# Patient Record
Sex: FEMALE | Race: BLACK OR AFRICAN AMERICAN | NOT HISPANIC OR LATINO | Employment: UNEMPLOYED | URBAN - NONMETROPOLITAN AREA
[De-identification: names, ages, dates, MRNs, and addresses within clinical notes are randomized per-mention and may not be internally consistent; named-entity substitution may affect disease eponyms.]

---

## 2020-07-24 ENCOUNTER — HISTORICAL (OUTPATIENT)
Dept: ADMINISTRATIVE | Facility: HOSPITAL | Age: 25
End: 2020-07-24

## 2020-07-24 ENCOUNTER — ANESTHESIA EVENT (OUTPATIENT)
Dept: SURGERY | Facility: HOSPITAL | Age: 25
End: 2020-07-24
Payer: MEDICAID

## 2020-07-24 LAB
ABO + RH BLD: NORMAL
BASOPHILS NFR BLD: 0 10 (ref 0–0.1)
BASOPHILS NFR BLD: 0.4 % (ref 0–1.5)
EOSINOPHIL NFR BLD: 0.1 10 (ref 0–0.7)
EOSINOPHIL NFR BLD: 0.7 % (ref 0–7)
ERYTHROCYTE [DISTWIDTH] IN BLOOD BY AUTOMATED COUNT: 13.2 % (ref 11.5–14.5)
GRAN #: 7.42 10 (ref 2–7.5)
GRAN%: 0.5 %
GRAN%: 67.3 % (ref 50–80)
HCT VFR BLD AUTO: 36.6 % (ref 37.7–47.9)
HGB BLD-MCNC: 12 G/DL (ref 12.2–16.2)
HISTORY CHECK: NORMAL
IMMATURE GRANULOCYTES #: 0.05 10
INDIRECT COOMBS GEL: NEGATIVE
LYMPH #: 2.5 10 (ref 1–3.5)
LYMPH%: 22.3 % (ref 12–50)
MCH RBC QN AUTO: 26.8 PG (ref 27–31)
MCHC RBC AUTO-ENTMCNC: 32.8 G% (ref 32–35)
MCV RBC AUTO: 81.7 FL (ref 80–97)
MONO #: 1 10 (ref 0–0.8)
MONO%: 8.8 % (ref 0–12)
PMV BLD AUTO: 10.3 FL (ref 7.4–10.4)
PMV BLD AUTO: 255 10 (ref 142–424)
RBC # BLD AUTO: 4.48 M/UL (ref 4.04–5.48)
WBC # BLD AUTO: 11 10 (ref 4–10.2)

## 2020-07-24 RX ORDER — METOCLOPRAMIDE HYDROCHLORIDE 5 MG/ML
10 INJECTION INTRAMUSCULAR; INTRAVENOUS
Status: CANCELLED | OUTPATIENT
Start: 2020-07-27 | End: 2020-07-27

## 2020-07-24 RX ORDER — SODIUM CITRATE AND CITRIC ACID MONOHYDRATE 334; 500 MG/5ML; MG/5ML
30 SOLUTION ORAL
Status: DISCONTINUED | OUTPATIENT
Start: 2020-07-27 | End: 2020-07-24

## 2020-07-24 RX ORDER — SODIUM CHLORIDE 0.9 % (FLUSH) 0.9 %
10 SYRINGE (ML) INJECTION
Status: CANCELLED | OUTPATIENT
Start: 2020-07-28

## 2020-07-24 RX ORDER — INSULIN ASPART 100 [IU]/ML
0-9 INJECTION, SOLUTION INTRAVENOUS; SUBCUTANEOUS SEE ADMIN INSTRUCTIONS
Status: CANCELLED | OUTPATIENT
Start: 2020-07-27

## 2020-07-24 RX ORDER — CEFAZOLIN SODIUM 2 G/50ML
2 SOLUTION INTRAVENOUS
Status: CANCELLED | OUTPATIENT
Start: 2020-07-27 | End: 2020-07-27

## 2020-07-24 RX ORDER — INSULIN ASPART 100 [IU]/ML
0-9 INJECTION, SOLUTION INTRAVENOUS; SUBCUTANEOUS SEE ADMIN INSTRUCTIONS
Status: DISCONTINUED | OUTPATIENT
Start: 2020-07-24 | End: 2020-07-24

## 2020-07-24 RX ORDER — SODIUM CITRATE AND CITRIC ACID MONOHYDRATE 334; 500 MG/5ML; MG/5ML
30 SOLUTION ORAL
Status: CANCELLED | OUTPATIENT
Start: 2020-07-27 | End: 2020-07-27

## 2020-07-24 RX ORDER — CEFAZOLIN SODIUM 2 G/50ML
2 SOLUTION INTRAVENOUS
Status: DISCONTINUED | OUTPATIENT
Start: 2020-07-27 | End: 2020-07-24

## 2020-07-24 RX ORDER — FAMOTIDINE 10 MG/ML
20 INJECTION INTRAVENOUS
Status: CANCELLED | OUTPATIENT
Start: 2020-07-24 | End: 2020-07-25

## 2020-07-24 RX ORDER — SODIUM CHLORIDE, SODIUM LACTATE, POTASSIUM CHLORIDE, CALCIUM CHLORIDE 600; 310; 30; 20 MG/100ML; MG/100ML; MG/100ML; MG/100ML
INJECTION, SOLUTION INTRAVENOUS CONTINUOUS
Status: CANCELLED | OUTPATIENT
Start: 2020-07-27

## 2020-07-24 RX ORDER — SODIUM CITRATE AND CITRIC ACID MONOHYDRATE 334; 500 MG/5ML; MG/5ML
30 SOLUTION ORAL ONCE
Status: CANCELLED | OUTPATIENT
Start: 2020-07-27

## 2020-07-24 NOTE — ANESTHESIA PREPROCEDURE EVALUATION
07/24/2020  Khris Sheikh is a 24 y.o., female.    Anesthesia Evaluation    I have reviewed the Patient Summary Reports.   I have reviewed the NPO Status.   I have reviewed the Medications.     Review of Systems  Anesthesia Hx:  No problems with previous Anesthesia  Denies Family Hx of Anesthesia complications.   Denies Personal Hx of Anesthesia complications.   Social:  Non-Smoker    Cardiovascular:  Cardiovascular Normal     Pulmonary:  Pulmonary Normal    Renal/:  Renal/ Normal     Hepatic/GI:  Hepatic/GI Normal    Neurological:  Neurology Normal    Endocrine:   Diabetes, well controlled, gestational        Physical Exam  General:  Well nourished    Airway/Jaw/Neck:  Airway Findings: Mouth Opening: Normal Tongue: Normal  General Airway Assessment: Adult  Jaw/Neck Findings:  Neck ROM: Normal ROM      Dental:  Dental Findings: In tact   Chest/Lungs:  Chest/Lungs Findings: Clear to auscultation, Normal Respiratory Rate     Heart/Vascular:  Heart Findings: Rate: Normal  Rhythm: Regular Rhythm  Sounds: Normal        Mental Status:  Mental Status Findings:  Cooperative         Anesthesia Plan  Type of Anesthesia, risks & benefits discussed:  Anesthesia Type:  spinal  Patient's Preference:   Intra-op Monitoring Plan: standard ASA monitors  Intra-op Monitoring Plan Comments:   Post Op Pain Control Plan: multimodal analgesia  Post Op Pain Control Plan Comments:   Induction:    Beta Blocker:  Patient is not currently on a Beta-Blocker (No further documentation required).       Informed Consent: Patient understands risks and agrees with Anesthesia plan.  Questions answered. Anesthesia consent signed with patient.  ASA Score: 2     Day of Surgery Review of History & Physical: I have interviewed and examined the patient. I have reviewed the patient's H&P dated:  There are no significant changes.  H&P update  referred to the surgeon.         Ready For Surgery From Anesthesia Perspective.

## 2020-07-25 LAB — SARS-COV-2 RNA RESP QL NAA+PROBE: NOT DETECTED

## 2020-07-27 ENCOUNTER — ANESTHESIA (OUTPATIENT)
Dept: SURGERY | Facility: HOSPITAL | Age: 25
End: 2020-07-27
Payer: MEDICAID

## 2020-07-27 ENCOUNTER — HOSPITAL ENCOUNTER (INPATIENT)
Facility: HOSPITAL | Age: 25
LOS: 2 days | Discharge: HOME | End: 2020-07-29
Attending: OBSTETRICS & GYNECOLOGY | Admitting: OBSTETRICS & GYNECOLOGY
Payer: MEDICAID

## 2020-07-27 DIAGNOSIS — Z98.891 PREVIOUS CESAREAN SECTION: ICD-10-CM

## 2020-07-27 LAB
AMPHET+METHAMPHET UR QL: NEGATIVE
BARBITURATES UR QL SCN>200 NG/ML: NEGATIVE
BENZODIAZ UR QL SCN>200 NG/ML: NEGATIVE
BZE UR QL SCN: NEGATIVE
CANNABINOIDS UR QL SCN: NEGATIVE
CREAT UR-MCNC: 68.3 MG/DL (ref 15–325)
METHADONE UR QL SCN>300 NG/ML: NEGATIVE
OPIATES UR QL SCN: NEGATIVE
PCP UR QL SCN>25 NG/ML: NEGATIVE
POCT GLUCOSE: 69 MG/DL (ref 70–110)
POCT GLUCOSE: 82 MG/DL (ref 70–110)
TOXICOLOGY INFORMATION: NORMAL

## 2020-07-27 PROCEDURE — 71000033 HC RECOVERY, INTIAL HOUR: Performed by: OBSTETRICS & GYNECOLOGY

## 2020-07-27 PROCEDURE — 63600175 PHARM REV CODE 636 W HCPCS: Performed by: OBSTETRICS & GYNECOLOGY

## 2020-07-27 PROCEDURE — 36000709 HC OR TIME LEV III EA ADD 15 MIN: Performed by: OBSTETRICS & GYNECOLOGY

## 2020-07-27 PROCEDURE — S0028 INJECTION, FAMOTIDINE, 20 MG: HCPCS | Performed by: OBSTETRICS & GYNECOLOGY

## 2020-07-27 PROCEDURE — 63600175 PHARM REV CODE 636 W HCPCS: Performed by: NURSE ANESTHETIST, CERTIFIED REGISTERED

## 2020-07-27 PROCEDURE — 37000008 HC ANESTHESIA 1ST 15 MINUTES: Performed by: OBSTETRICS & GYNECOLOGY

## 2020-07-27 PROCEDURE — 11000001 HC ACUTE MED/SURG PRIVATE ROOM

## 2020-07-27 PROCEDURE — 27201423 OPTIME MED/SURG SUP & DEVICES STERILE SUPPLY: Performed by: OBSTETRICS & GYNECOLOGY

## 2020-07-27 PROCEDURE — 36000708 HC OR TIME LEV III 1ST 15 MIN: Performed by: OBSTETRICS & GYNECOLOGY

## 2020-07-27 PROCEDURE — 25000003 PHARM REV CODE 250: Performed by: NURSE ANESTHETIST, CERTIFIED REGISTERED

## 2020-07-27 PROCEDURE — 80307 DRUG TEST PRSMV CHEM ANLYZR: CPT

## 2020-07-27 PROCEDURE — 82962 GLUCOSE BLOOD TEST: CPT | Performed by: OBSTETRICS & GYNECOLOGY

## 2020-07-27 PROCEDURE — 25000003 PHARM REV CODE 250: Performed by: OBSTETRICS & GYNECOLOGY

## 2020-07-27 PROCEDURE — 37000009 HC ANESTHESIA EA ADD 15 MINS: Performed by: OBSTETRICS & GYNECOLOGY

## 2020-07-27 RX ORDER — SODIUM CHLORIDE 0.9 % (FLUSH) 0.9 %
10 SYRINGE (ML) INJECTION
Status: DISCONTINUED | OUTPATIENT
Start: 2020-07-28 | End: 2020-07-29 | Stop reason: HOSPADM

## 2020-07-27 RX ORDER — ONDANSETRON 2 MG/ML
INJECTION INTRAMUSCULAR; INTRAVENOUS
Status: DISCONTINUED | OUTPATIENT
Start: 2020-07-27 | End: 2020-07-27

## 2020-07-27 RX ORDER — HYDROCORTISONE 25 MG/G
CREAM TOPICAL 3 TIMES DAILY PRN
Status: DISCONTINUED | OUTPATIENT
Start: 2020-07-27 | End: 2020-07-29 | Stop reason: HOSPADM

## 2020-07-27 RX ORDER — EPHEDRINE SULFATE 50 MG/ML
INJECTION, SOLUTION INTRAVENOUS
Status: DISCONTINUED | OUTPATIENT
Start: 2020-07-27 | End: 2020-07-27

## 2020-07-27 RX ORDER — ADHESIVE BANDAGE
30 BANDAGE TOPICAL 2 TIMES DAILY PRN
Status: DISCONTINUED | OUTPATIENT
Start: 2020-07-28 | End: 2020-07-29 | Stop reason: HOSPADM

## 2020-07-27 RX ORDER — SODIUM CHLORIDE, SODIUM LACTATE, POTASSIUM CHLORIDE, CALCIUM CHLORIDE 600; 310; 30; 20 MG/100ML; MG/100ML; MG/100ML; MG/100ML
INJECTION, SOLUTION INTRAVENOUS CONTINUOUS
Status: DISCONTINUED | OUTPATIENT
Start: 2020-07-27 | End: 2020-07-28

## 2020-07-27 RX ORDER — CEFAZOLIN SODIUM 2 G/50ML
2 SOLUTION INTRAVENOUS
Status: COMPLETED | OUTPATIENT
Start: 2020-07-27 | End: 2020-07-27

## 2020-07-27 RX ORDER — OXYTOCIN/RINGER'S LACTATE 30/500 ML
95 PLASTIC BAG, INJECTION (ML) INTRAVENOUS ONCE
Status: COMPLETED | OUTPATIENT
Start: 2020-07-27 | End: 2020-07-27

## 2020-07-27 RX ORDER — SODIUM CHLORIDE, SODIUM LACTATE, POTASSIUM CHLORIDE, CALCIUM CHLORIDE 600; 310; 30; 20 MG/100ML; MG/100ML; MG/100ML; MG/100ML
INJECTION, SOLUTION INTRAVENOUS CONTINUOUS
Status: DISCONTINUED | OUTPATIENT
Start: 2020-07-27 | End: 2020-07-27

## 2020-07-27 RX ORDER — MORPHINE SULFATE 10 MG/ML
2 INJECTION INTRAMUSCULAR; INTRAVENOUS; SUBCUTANEOUS EVERY 5 MIN PRN
Status: DISCONTINUED | OUTPATIENT
Start: 2020-07-27 | End: 2020-07-27

## 2020-07-27 RX ORDER — DIPHENHYDRAMINE HCL 25 MG
25 CAPSULE ORAL EVERY 4 HOURS PRN
Status: DISCONTINUED | OUTPATIENT
Start: 2020-07-27 | End: 2020-07-29 | Stop reason: HOSPADM

## 2020-07-27 RX ORDER — METOCLOPRAMIDE HYDROCHLORIDE 5 MG/ML
10 INJECTION INTRAMUSCULAR; INTRAVENOUS
Status: COMPLETED | OUTPATIENT
Start: 2020-07-27 | End: 2020-07-27

## 2020-07-27 RX ORDER — SODIUM CITRATE AND CITRIC ACID MONOHYDRATE 334; 500 MG/5ML; MG/5ML
30 SOLUTION ORAL ONCE
Status: COMPLETED | OUTPATIENT
Start: 2020-07-27 | End: 2020-07-27

## 2020-07-27 RX ORDER — ONDANSETRON 4 MG/1
8 TABLET, ORALLY DISINTEGRATING ORAL EVERY 8 HOURS PRN
Status: DISCONTINUED | OUTPATIENT
Start: 2020-07-27 | End: 2020-07-29 | Stop reason: HOSPADM

## 2020-07-27 RX ORDER — ONDANSETRON 2 MG/ML
4 INJECTION INTRAMUSCULAR; INTRAVENOUS EVERY 6 HOURS PRN
Status: DISCONTINUED | OUTPATIENT
Start: 2020-07-27 | End: 2020-07-27

## 2020-07-27 RX ORDER — HYDROMORPHONE HYDROCHLORIDE 1 MG/ML
0.2 INJECTION, SOLUTION INTRAMUSCULAR; INTRAVENOUS; SUBCUTANEOUS EVERY 5 MIN PRN
Status: DISCONTINUED | OUTPATIENT
Start: 2020-07-27 | End: 2020-07-27

## 2020-07-27 RX ORDER — MUPIROCIN 20 MG/G
OINTMENT TOPICAL 2 TIMES DAILY
Status: DISCONTINUED | OUTPATIENT
Start: 2020-07-27 | End: 2020-07-27

## 2020-07-27 RX ORDER — INSULIN ASPART 100 [IU]/ML
0-9 INJECTION, SOLUTION INTRAVENOUS; SUBCUTANEOUS SEE ADMIN INSTRUCTIONS
Status: DISCONTINUED | OUTPATIENT
Start: 2020-07-27 | End: 2020-07-27

## 2020-07-27 RX ORDER — DOCUSATE SODIUM 100 MG/1
200 CAPSULE, LIQUID FILLED ORAL 2 TIMES DAILY
Status: DISCONTINUED | OUTPATIENT
Start: 2020-07-27 | End: 2020-07-28

## 2020-07-27 RX ORDER — FAMOTIDINE 10 MG/ML
20 INJECTION INTRAVENOUS
Status: COMPLETED | OUTPATIENT
Start: 2020-07-27 | End: 2020-07-27

## 2020-07-27 RX ORDER — PRENATAL WITH FERROUS FUM AND FOLIC ACID 3080; 920; 120; 400; 22; 1.84; 3; 20; 10; 1; 12; 200; 27; 25; 2 [IU]/1; [IU]/1; MG/1; [IU]/1; MG/1; MG/1; MG/1; MG/1; MG/1; MG/1; UG/1; MG/1; MG/1; MG/1; MG/1
1 TABLET ORAL DAILY
Status: DISCONTINUED | OUTPATIENT
Start: 2020-07-27 | End: 2020-07-27

## 2020-07-27 RX ORDER — IBUPROFEN 800 MG/1
800 TABLET ORAL EVERY 8 HOURS
Status: DISCONTINUED | OUTPATIENT
Start: 2020-07-27 | End: 2020-07-29 | Stop reason: HOSPADM

## 2020-07-27 RX ORDER — AMOXICILLIN 250 MG
1 CAPSULE ORAL NIGHTLY PRN
Status: DISCONTINUED | OUTPATIENT
Start: 2020-07-27 | End: 2020-07-29 | Stop reason: HOSPADM

## 2020-07-27 RX ORDER — OXYCODONE AND ACETAMINOPHEN 10; 325 MG/1; MG/1
1 TABLET ORAL EVERY 4 HOURS PRN
Status: DISCONTINUED | OUTPATIENT
Start: 2020-07-27 | End: 2020-07-29 | Stop reason: SDUPTHER

## 2020-07-27 RX ORDER — OXYTOCIN/RINGER'S LACTATE 30/500 ML
42 PLASTIC BAG, INJECTION (ML) INTRAVENOUS ONCE
Status: COMPLETED | OUTPATIENT
Start: 2020-07-28 | End: 2020-07-28

## 2020-07-27 RX ORDER — DIPHENHYDRAMINE HYDROCHLORIDE 50 MG/ML
25 INJECTION INTRAMUSCULAR; INTRAVENOUS EVERY 6 HOURS PRN
Status: DISCONTINUED | OUTPATIENT
Start: 2020-07-27 | End: 2020-07-27

## 2020-07-27 RX ORDER — SIMETHICONE 80 MG
1 TABLET,CHEWABLE ORAL EVERY 6 HOURS PRN
Status: DISCONTINUED | OUTPATIENT
Start: 2020-07-27 | End: 2020-07-29 | Stop reason: HOSPADM

## 2020-07-27 RX ORDER — OXYTOCIN 10 [USP'U]/ML
INJECTION, SOLUTION INTRAMUSCULAR; INTRAVENOUS
Status: DISCONTINUED | OUTPATIENT
Start: 2020-07-27 | End: 2020-07-27

## 2020-07-27 RX ORDER — BISACODYL 10 MG
10 SUPPOSITORY, RECTAL RECTAL ONCE AS NEEDED
Status: DISCONTINUED | OUTPATIENT
Start: 2020-07-27 | End: 2020-07-29 | Stop reason: HOSPADM

## 2020-07-27 RX ADMIN — OXYTOCIN 20 UNITS: 10 INJECTION, SOLUTION INTRAMUSCULAR; INTRAVENOUS at 09:07

## 2020-07-27 RX ADMIN — EPHEDRINE SULFATE 25 MG: 50 INJECTION, SOLUTION INTRAVENOUS at 08:07

## 2020-07-27 RX ADMIN — ONDANSETRON 4 MG: 2 INJECTION INTRAMUSCULAR; INTRAVENOUS at 09:07

## 2020-07-27 RX ADMIN — DOCUSATE SODIUM 200 MG: 100 CAPSULE, LIQUID FILLED ORAL at 10:07

## 2020-07-27 RX ADMIN — FAMOTIDINE 20 MG: 10 INJECTION, SOLUTION INTRAVENOUS at 07:07

## 2020-07-27 RX ADMIN — SODIUM CHLORIDE, SODIUM LACTATE, POTASSIUM CHLORIDE, AND CALCIUM CHLORIDE: .6; .31; .03; .02 INJECTION, SOLUTION INTRAVENOUS at 12:07

## 2020-07-27 RX ADMIN — SODIUM CHLORIDE, SODIUM LACTATE, POTASSIUM CHLORIDE, AND CALCIUM CHLORIDE: 600; 310; 30; 20 INJECTION, SOLUTION INTRAVENOUS at 08:07

## 2020-07-27 RX ADMIN — Medication 95 MILLI-UNITS/MIN: at 01:07

## 2020-07-27 RX ADMIN — THERA TABS 1 TABLET: TAB at 01:07

## 2020-07-27 RX ADMIN — OXYCODONE HYDROCHLORIDE AND ACETAMINOPHEN 1 TABLET: 10; 325 TABLET ORAL at 11:07

## 2020-07-27 RX ADMIN — METOCLOPRAMIDE 10 MG: 5 INJECTION, SOLUTION INTRAMUSCULAR; INTRAVENOUS at 07:07

## 2020-07-27 RX ADMIN — SODIUM CITRATE AND CITRIC ACID MONOHYDRATE 30 ML: 500; 334 SOLUTION ORAL at 07:07

## 2020-07-27 RX ADMIN — OXYCODONE HYDROCHLORIDE AND ACETAMINOPHEN 1 TABLET: 10; 325 TABLET ORAL at 03:07

## 2020-07-27 RX ADMIN — IBUPROFEN 800 MG: 800 TABLET, FILM COATED ORAL at 09:07

## 2020-07-27 RX ADMIN — CEFAZOLIN SODIUM 2 G: 2 SOLUTION INTRAVENOUS at 07:07

## 2020-07-27 RX ADMIN — IBUPROFEN 800 MG: 800 TABLET, FILM COATED ORAL at 03:07

## 2020-07-27 RX ADMIN — DOCUSATE SODIUM 200 MG: 100 CAPSULE, LIQUID FILLED ORAL at 08:07

## 2020-07-27 NOTE — H&P
[place] - Labor And Delivery  Obstetrics  History & Physical    Patient Name: Khris Sheikh  MRN: [id number]  Admission Date: 2020  Primary Care Provider: Cindy Garnica MD    Subjective:     Principal Problem:<principal problem not specified>    History of Present Illness:  23 yo  at 39.0 weeks presents for scheduled repeat     Obstetric HPI:  Patient reports None contractions, active fetal movement, No vaginal bleeding , No loss of fluid     This pregnancy has been complicated by Gestational diabetes, and rubella non-immune.    OB History    Para Term  AB Living   2 1 0 0 0 1   SAB TAB Ectopic Multiple Live Births   0 0 0 0 1      # Outcome Date GA Lbr Reinaldo/2nd Weight Sex Delivery Anes PTL Lv   2 Current            1 Para      CS-LTranv   PRIYA     Past Medical History:   Diagnosis Date    Diabetes mellitus     Gestational diabetes      Past Surgical History:   Procedure Laterality Date     SECTION         PTA Medications   Medication Sig    prenatal vit37/iron/folic acid (PRENATA ORAL) Take 1 tablet by mouth once daily.       Review of patient's allergies indicates:  No Known Allergies     Family History     Problem Relation (Age of Onset)    Diabetes Mother, Father    Heart failure Mother    Lung disease Father        Tobacco Use    Smoking status: Never Smoker   Substance and Sexual Activity    Alcohol use: Not Currently    Drug use: Not Currently    Sexual activity: Not on file     Review of Systems   Respiratory: Negative for cough and shortness of breath.    Gastrointestinal: Positive for abdominal pain. Negative for constipation and diarrhea.      Objective:     Vital Signs (Most Recent):    Vital Signs (24h Range):        Weight: 104.3 kg (230 lb)  Body mass index is 40.74 kg/m².    FHT: 130's Cat 1 (reassuring)  TOCO:  Q 8 minutes    Physical Exam:   Constitutional: She appears well-developed and well-nourished.       Cardiovascular: Normal rate and regular  rhythm.  Exam reveals no clubbing, no cyanosis and no edema.    No murmur heard.   Pulmonary/Chest: Effort normal and breath sounds normal.        Abdominal: Soft. There is no abdominal tenderness.                  Skin: No cyanosis. Nails show no clubbing.        Cervix:  Dilation:  0  Effacement:  50%  Station: -2  Presentation: Vertex     Significant Labs:  Lab Results   Component Value Date    [facility]       I have personallly reviewed all pertinent lab results from the last 24 hours.    Assessment/Plan:     24 y.o. female  at Unknown for:    Previous  section  Proceed with repeat         Cindy Garnica MD  Obstetrics  [place] - Labor And Delivery

## 2020-07-27 NOTE — ANESTHESIA PROCEDURE NOTES
Spinal    Diagnosis: previos csection  Patient location during procedure: OR  Start time: 7/27/2020 8:42 AM  Timeout: 7/27/2020 8:38 AM  End time: 7/27/2020 8:48 AM    Staffing  Authorizing Provider: Km Lockwood MD  Performing Provider: Kaleb Murcia CRNA    Preanesthetic Checklist  Completed: patient identified, site marked, surgical consent, pre-op evaluation, timeout performed, IV checked, risks and benefits discussed and monitors and equipment checked  Spinal Block  Patient position: sitting  Prep: Betadine  Patient monitoring: heart rate, cardiac monitor and continuous pulse ox  Approach: midline  Location: L3-4  Injection technique: lumbar puncture  CSF Fluid: clear free-flowing CSF  Needle  Needle type: pencil-tip   Needle gauge: 25 G  Needle length: 3.5 in  Additional Documentation: negative aspiration for heme and no paresthesia on injection  Needle localization: anatomical landmarks  Assessment  Sensory level: T5   Dermatomal levels determined by pinch or prick  Ease of block: easy  Patient's tolerance of the procedure: comfortable throughout block

## 2020-07-27 NOTE — SUBJECTIVE & OBJECTIVE
Obstetric HPI:  Patient reports None contractions, active fetal movement, No vaginal bleeding , No loss of fluid     This pregnancy has been complicated by Gestational diabetes, and rubella non-immune.    OB History    Para Term  AB Living   2 1 0 0 0 1   SAB TAB Ectopic Multiple Live Births   0 0 0 0 1      # Outcome Date GA Lbr Reinaldo/2nd Weight Sex Delivery Anes PTL Lv   2 Current            1 Para      CS-LTranv   PRIYA     Past Medical History:   Diagnosis Date    Diabetes mellitus     Gestational diabetes      Past Surgical History:   Procedure Laterality Date     SECTION         PTA Medications   Medication Sig    prenatal vit37/iron/folic acid (PRENATA ORAL) Take 1 tablet by mouth once daily.       Review of patient's allergies indicates:  No Known Allergies     Family History     Problem Relation (Age of Onset)    Diabetes Mother, Father    Heart failure Mother    Lung disease Father        Tobacco Use    Smoking status: Never Smoker   Substance and Sexual Activity    Alcohol use: Not Currently    Drug use: Not Currently    Sexual activity: Not on file     Review of Systems   Respiratory: Negative for cough and shortness of breath.    Gastrointestinal: Positive for abdominal pain. Negative for constipation and diarrhea.      Objective:     Vital Signs (Most Recent):    Vital Signs (24h Range):        Weight: 104.3 kg (230 lb)  Body mass index is 40.74 kg/m².    FHT: 130's Cat 1 (reassuring)  TOCO:  Q 8 minutes    Physical Exam:   Constitutional: She appears well-developed and well-nourished.       Cardiovascular: Normal rate and regular rhythm.  Exam reveals no clubbing, no cyanosis and no edema.    No murmur heard.   Pulmonary/Chest: Effort normal and breath sounds normal.        Abdominal: Soft. There is no abdominal tenderness.                  Skin: No cyanosis. Nails show no clubbing.        Cervix:  Dilation:  0  Effacement:  50%  Station: -2  Presentation: Vertex      Significant Labs:  Lab Results   Component Value Date    [facility] AB POSITIVE 07/24/2020       I have personallly reviewed all pertinent lab results from the last 24 hours.

## 2020-07-27 NOTE — TRANSFER OF CARE
"Anesthesia Transfer of Care Note    Patient: Khris Sheikh    Procedure(s) Performed: Procedure(s) (LRB):   SECTION (N/A)    Patient location: PACU    Anesthesia Type: spinal    Transport from OR: Transported from OR on room air with adequate spontaneous ventilation    Post pain: adequate analgesia    Post assessment: no apparent anesthetic complications    Post vital signs: stable    Level of consciousness: awake    Nausea/Vomiting: no nausea/vomiting    Complications: none    Transfer of care protocol was followed      Last vitals:   Visit Vitals  Temp 36.5 °C (97.7 °F)   Ht 5' 3" (1.6 m)   Wt 104.3 kg (230 lb)   Breastfeeding Unknown   BMI 40.74 kg/m²     "

## 2020-07-27 NOTE — OP NOTE
OPERATIVE NOTE       SUMMARY      Surgery Date: 2020     SURGEON: PB COTTON MD     ASSISTANT: ST LIDIA and ST GERALD    PREOP DIAGNOSIS: 1. Intrauterine pregnancy at 39.0 wks     2. Previous  x 1     3. Gestational diabetes    POSTOP DIAGNOSIS:   1. Intrauterine pregnancy at 39.0 wks     2. Previous  x 1     3. Gestational diabetes    PROCEDURES: repeat low-transverse  section    ANESTHESIA: spinal     FINDINGS/KEY COMPONENTS: viable male infant APGAR 9/9, weight unavailable at time of delivery     ESTIMATED BLOOD LOSS: 500cc    IV FLUIDS: 1300cc    UOP: 100cc     COMPLICATIONS: None    PATHOLOGY:   Specimens     None        PROCEDURE:    The patient was taken to the operating room where spinal anesthesia was found to be adequate.  She was prepped and draped in the normal sterile fashion.  Pfannensteil skin incision was made with the scalpel and carried down to the lower layer of fascia with the scalpel.  The fascia was incised in the mid-line.  This incision was extended laterally with the cautery.  The superior aspect of the incision was grasped with [facility] clamps, tented up, and the underlying muscles were dissected off bluntly.  The inferior aspect of the incision was grasped with the [facility] clamps and the underlying muscles were dissected off bluntly.  The muscles were  in the mid-line.  The peritoneum was grasped with hemostats and entered in bluntly.  This incision was extended superiorly and inferiorly with good visualization of the bladder.  The agusto self-retaining retractor was inserted without difficulty.  A low transverse incision was made on the uterus.  This incision was extended laterally with finger fracture.  The infant's head was delivered atraumatically.  The nose and mouth were suctioned with bulb suction.  The cord was clamped and cut.  The infant was handed to the waiting nurse.  The placenta was delivered spontaneously.  The uterus  was exteriorized and cleared of all clots and debris.  The uterine incision was repaired with #0 monocryl in a running, locked fashion.  Excellent hemostasis was noted.  The posterior gutter was cleared of all clots and debris.  The uterus was returned to the abdomen.  All instruments removed from the abdominal cavity. The incision was again inspected and found to be hemostatic.   The muscle was reapproximated in arunning fashion with #1 Chromic.  The fascia was reapproximated with #0 vicryl in a running fashion.  Skin was closed with 3-0 Monocryl in a subcuticular fashion.  Sponge, lap, needle counts were correct at beginning and end of procedure.  Patient was taken to the recover room awake and in stable condition with the Duarte draining clear urine.

## 2020-07-27 NOTE — PLAN OF CARE
Patient resting in bed with significant other at bedside. Patient denies any pain at this time, patient states medication effective. Bed in locked, lowest position, call bell within reach, water pitcher within reach.

## 2020-07-27 NOTE — PLAN OF CARE
Patient has maintained pain control with PO medications. Patient and baby has maintained feed goals. Patient has remained comfortable and has rested on and off throughout the shift post surgery.

## 2020-07-27 NOTE — ANESTHESIA POSTPROCEDURE EVALUATION
Anesthesia Post Evaluation    Patient: Khris Sheikh    Procedure(s) Performed: Procedure(s) (LRB):   SECTION (N/A)    Final Anesthesia Type: spinal    Patient location during evaluation: PACU  Patient participation: Yes- Able to Participate  Level of consciousness: awake  Post-procedure vital signs: reviewed and stable  Pain management: adequate  Airway patency: patent    PONV status at discharge: No PONV  Anesthetic complications: no      Cardiovascular status: blood pressure returned to baseline  Respiratory status: spontaneous ventilation  Hydration status: euvolemic  Follow-up not needed.          Vitals Value Taken Time   /63 20 0932   Temp 36.5 °C (97.7 °F) 20 0930   Pulse 101 20 0935   Resp 25 20 0935   SpO2 100 % 20 0935   Vitals shown include unvalidated device data.      No case tracking events are documented in the log.      Pain/Champ Score: No data recorded

## 2020-07-27 NOTE — PLAN OF CARE
Breasts soft no bra, fundus firm at umbilicus, lochia rubra scant, sanitary napkin x 2 to perineum. Warm blankets. Transported back to .

## 2020-07-28 LAB
BASOPHILS # BLD AUTO: 0.02 K/UL (ref 0–0.2)
BASOPHILS NFR BLD: 0.2 % (ref 0–1.9)
DIFFERENTIAL METHOD: ABNORMAL
EOSINOPHIL # BLD AUTO: 0.1 K/UL (ref 0–0.5)
EOSINOPHIL NFR BLD: 1.1 % (ref 0–8)
ERYTHROCYTE [DISTWIDTH] IN BLOOD BY AUTOMATED COUNT: 13.3 % (ref 11.5–14.5)
HCT VFR BLD AUTO: 36.4 % (ref 37–48.5)
HGB BLD-MCNC: 11.8 G/DL (ref 12–16)
IMM GRANULOCYTES # BLD AUTO: 0.06 K/UL (ref 0–0.04)
IMM GRANULOCYTES NFR BLD AUTO: 0.5 % (ref 0–0.5)
LYMPHOCYTES # BLD AUTO: 2 K/UL (ref 1–4.8)
LYMPHOCYTES NFR BLD: 16.5 % (ref 18–48)
MCH RBC QN AUTO: 26.8 PG (ref 27–31)
MCHC RBC AUTO-ENTMCNC: 32.4 G/DL (ref 32–36)
MCV RBC AUTO: 83 FL (ref 82–98)
MONOCYTES # BLD AUTO: 1 K/UL (ref 0.3–1)
MONOCYTES NFR BLD: 8.1 % (ref 4–15)
NEUTROPHILS # BLD AUTO: 9.1 K/UL (ref 1.8–7.7)
NEUTROPHILS NFR BLD: 73.6 % (ref 38–73)
NRBC BLD-RTO: 0 /100 WBC
PLATELET # BLD AUTO: 234 K/UL (ref 150–350)
PMV BLD AUTO: 10.2 FL (ref 9.2–12.9)
POCT GLUCOSE: 100 MG/DL (ref 70–110)
POCT GLUCOSE: 77 MG/DL (ref 70–110)
RBC # BLD AUTO: 4.41 M/UL (ref 4–5.4)
WBC # BLD AUTO: 12.36 K/UL (ref 3.9–12.7)

## 2020-07-28 PROCEDURE — 11000001 HC ACUTE MED/SURG PRIVATE ROOM

## 2020-07-28 PROCEDURE — 25000003 PHARM REV CODE 250: Performed by: OBSTETRICS & GYNECOLOGY

## 2020-07-28 PROCEDURE — 36415 COLL VENOUS BLD VENIPUNCTURE: CPT

## 2020-07-28 PROCEDURE — 85025 COMPLETE CBC W/AUTO DIFF WBC: CPT

## 2020-07-28 PROCEDURE — 63600175 PHARM REV CODE 636 W HCPCS: Performed by: OBSTETRICS & GYNECOLOGY

## 2020-07-28 RX ORDER — OXYTOCIN/RINGER'S LACTATE 30/500 ML
PLASTIC BAG, INJECTION (ML) INTRAVENOUS
Status: COMPLETED
Start: 2020-07-28 | End: 2020-07-28

## 2020-07-28 RX ORDER — DOCUSATE SODIUM 100 MG/1
100 CAPSULE, LIQUID FILLED ORAL 2 TIMES DAILY
Status: DISCONTINUED | OUTPATIENT
Start: 2020-07-28 | End: 2020-07-29 | Stop reason: HOSPADM

## 2020-07-28 RX ADMIN — Medication 42 MILLI-UNITS/MIN: at 01:07

## 2020-07-28 RX ADMIN — OXYCODONE HYDROCHLORIDE AND ACETAMINOPHEN 1 TABLET: 10; 325 TABLET ORAL at 02:07

## 2020-07-28 RX ADMIN — DOCUSATE SODIUM 100 MG: 100 CAPSULE, LIQUID FILLED ORAL at 09:07

## 2020-07-28 RX ADMIN — THERA TABS 1 TABLET: TAB at 09:07

## 2020-07-28 RX ADMIN — OXYCODONE HYDROCHLORIDE AND ACETAMINOPHEN 1 TABLET: 10; 325 TABLET ORAL at 09:07

## 2020-07-28 RX ADMIN — IBUPROFEN 800 MG: 800 TABLET, FILM COATED ORAL at 09:07

## 2020-07-28 RX ADMIN — IBUPROFEN 800 MG: 800 TABLET, FILM COATED ORAL at 02:07

## 2020-07-28 RX ADMIN — IBUPROFEN 800 MG: 800 TABLET, FILM COATED ORAL at 05:07

## 2020-07-28 NOTE — PROGRESS NOTES
[facility] - Labor And Delivery  Obstetrics  Postpartum Progress Note    Patient Name: Khris Sheikh  MRN: [id number]  Admission Date: 2020  Hospital Length of Stay: 1 days  Attending Physician: Cindy Garnica MD  Primary Care Provider: Cindy Garnica MD    Subjective:     Principal Problem:Previous  section    Hospital Course:  L&D to OR    Interval History:    She is doing well this morning. She is tolerating a regular diet without nausea or vomiting. She is voiding spontaneously. She is ambulatingm since alaniz removved. She has not passed flatus, and has not a BM. Vaginal bleeding is mild. She denies fever or chills. Abdominal pain is mild and controlled with oral medications. She is not breastfeeding.     Objective:     Vital Signs (Most Recent):  Temp: 98.5 °F (36.9 °C) (20 0600)  Pulse: 90 (20 0400)  Resp: 18 (20 0400)  BP: 121/61 (20 0400)  SpO2: 98 % (20) Vital Signs (24h Range):  Temp:  [97.5 °F (36.4 °C)-98.9 °F (37.2 °C)] 98.5 °F (36.9 °C)  Pulse:  [] 90  Resp:  [13-27] 18  SpO2:  [98 %-100 %] 98 %  BP: (114-135)/(58-67) 121/61     Weight: 104.3 kg (230 lb)  Body mass index is 40.74 kg/m².      Intake/Output Summary (Last 24 hours) at 2020 0813  Last data filed at 2020 0600  Gross per 24 hour   Intake 6726 ml   Output 4250 ml   Net 2476 ml           Significant Labs:  Lab Results   Component Value Date    [facility] AB POSITIVE 2020     No results for input(s): HGB, HCT in the last 48 hours.    I have personallly reviewed all pertinent lab results from the last 24 hours.    Physical Exam:   Constitutional: She appears well-developed.       Cardiovascular: Normal rate and regular rhythm.  Exam reveals no clubbing, no cyanosis and no edema.     Pulmonary/Chest: Breath sounds normal. She has no wheezes.        Abdominal: Soft. Bowel sounds are normal. She exhibits no distension.                  Skin: No cyanosis. Nails show no clubbing.     Psychiatric: She has a normal mood and affect.       Assessment/Plan:     24 y.o. female  for:    * Previous  section  Ambulate and await flatus        Disposition: As patient meets milestones, will plan to discharge tomorrow.    Cindy Garnica MD  Obstetrics  [place] - Labor And Delivery

## 2020-07-28 NOTE — PLAN OF CARE
Problem: Adult Inpatient Plan of Care  Goal: Plan of Care Review  Outcome: Ongoing, Progressing  Goal: Patient-Specific Goal (Individualization)  Outcome: Ongoing, Progressing  Goal: Absence of Hospital-Acquired Illness or Injury  Outcome: Ongoing, Progressing  Goal: Optimal Comfort and Wellbeing  Outcome: Ongoing, Progressing  Goal: Readiness for Transition of Care  Outcome: Ongoing, Progressing  Goal: Rounds/Family Conference  Outcome: Ongoing, Progressing     Problem:  Fall Injury Risk  Goal: Absence of Fall, Infant Drop and Related Injury  Outcome: Ongoing, Progressing     Problem: Infection  Goal: Infection Symptom Resolution  Outcome: Ongoing, Progressing     Problem: Skin Injury Risk Increased  Goal: Skin Health and Integrity  Outcome: Ongoing, Progressing     Problem: Adjustment to Role Transition (Postpartum  Delivery)  Goal: Successful Maternal Role Transition  Outcome: Ongoing, Progressing     Problem: Bleeding (Postpartum  Delivery)  Goal: Hemostasis  Outcome: Ongoing, Progressing     Problem: Infection (Postpartum  Delivery)  Goal: Absence of Infection Signs/Symptoms  Outcome: Ongoing, Progressing     Problem: Pain (Postpartum  Delivery)  Goal: Acceptable Pain Control  Outcome: Ongoing, Progressing     Problem: Postoperative Nausea and Vomiting (Postpartum  Delivery)  Goal: Nausea and Vomiting Relief  Outcome: Ongoing, Progressing     Problem: Postoperative Urinary Retention (Postpartum  Delivery)  Goal: Effective Urinary Elimination  Outcome: Ongoing, Progressing   Pt resting without any needs or complaints of discomfort at this time

## 2020-07-28 NOTE — SUBJECTIVE & OBJECTIVE
Interval History:    She is doing well this morning. She is tolerating a regular diet without nausea or vomiting. She is voiding spontaneously. She is ambulatingm since alaniz removved. She has not passed flatus, and has not a BM. Vaginal bleeding is mild. She denies fever or chills. Abdominal pain is mild and controlled with oral medications. She is not breastfeeding.     Objective:     Vital Signs (Most Recent):  Temp: 98.5 °F (36.9 °C) (07/28/20 0600)  Pulse: 90 (07/28/20 0400)  Resp: 18 (07/28/20 0400)  BP: 121/61 (07/28/20 0400)  SpO2: 98 % (07/27/20 2000) Vital Signs (24h Range):  Temp:  [97.5 °F (36.4 °C)-98.9 °F (37.2 °C)] 98.5 °F (36.9 °C)  Pulse:  [] 90  Resp:  [13-27] 18  SpO2:  [98 %-100 %] 98 %  BP: (114-135)/(58-67) 121/61     Weight: 104.3 kg (230 lb)  Body mass index is 40.74 kg/m².      Intake/Output Summary (Last 24 hours) at 7/28/2020 0813  Last data filed at 7/28/2020 0600  Gross per 24 hour   Intake 6726 ml   Output 4250 ml   Net 2476 ml           Significant Labs:  Lab Results   Component Value Date    [facility] AB POSITIVE 07/24/2020     No results for input(s): HGB, HCT in the last 48 hours.    I have personallly reviewed all pertinent lab results from the last 24 hours.    Physical Exam:   Constitutional: She appears well-developed.       Cardiovascular: Normal rate and regular rhythm.  Exam reveals no clubbing, no cyanosis and no edema.     Pulmonary/Chest: Breath sounds normal. She has no wheezes.        Abdominal: Soft. Bowel sounds are normal. She exhibits no distension.                  Skin: No cyanosis. Nails show no clubbing.    Psychiatric: She has a normal mood and affect.

## 2020-07-29 VITALS
BODY MASS INDEX: 40.75 KG/M2 | DIASTOLIC BLOOD PRESSURE: 60 MMHG | WEIGHT: 230 LBS | RESPIRATION RATE: 18 BRPM | SYSTOLIC BLOOD PRESSURE: 124 MMHG | TEMPERATURE: 97 F | OXYGEN SATURATION: 98 % | HEIGHT: 63 IN | HEART RATE: 86 BPM

## 2020-07-29 PROBLEM — Z98.891 PREVIOUS CESAREAN SECTION: Status: RESOLVED | Noted: 2020-07-27 | Resolved: 2020-07-29

## 2020-07-29 LAB — POCT GLUCOSE: 93 MG/DL (ref 70–110)

## 2020-07-29 PROCEDURE — 94799 UNLISTED PULMONARY SVC/PX: CPT

## 2020-07-29 PROCEDURE — 99900035 HC TECH TIME PER 15 MIN (STAT)

## 2020-07-29 PROCEDURE — 63600175 PHARM REV CODE 636 W HCPCS: Mod: SL | Performed by: OBSTETRICS & GYNECOLOGY

## 2020-07-29 PROCEDURE — 25000003 PHARM REV CODE 250: Performed by: OBSTETRICS & GYNECOLOGY

## 2020-07-29 PROCEDURE — 90471 IMMUNIZATION ADMIN: CPT | Mod: VFC | Performed by: OBSTETRICS & GYNECOLOGY

## 2020-07-29 PROCEDURE — 90710 MMRV VACCINE SC: CPT | Mod: SL | Performed by: OBSTETRICS & GYNECOLOGY

## 2020-07-29 RX ORDER — IBUPROFEN 800 MG/1
800 TABLET ORAL EVERY 8 HOURS
Qty: 30 TABLET | Refills: 1 | Status: SHIPPED | OUTPATIENT
Start: 2020-07-29 | End: 2022-05-10

## 2020-07-29 RX ORDER — OXYCODONE AND ACETAMINOPHEN 5; 325 MG/1; MG/1
1 TABLET ORAL EVERY 4 HOURS PRN
Status: DISCONTINUED | OUTPATIENT
Start: 2020-07-29 | End: 2020-07-29 | Stop reason: HOSPADM

## 2020-07-29 RX ORDER — DOCUSATE SODIUM 100 MG/1
100 CAPSULE, LIQUID FILLED ORAL 2 TIMES DAILY
Qty: 60 CAPSULE | Refills: 1 | Status: SHIPPED | OUTPATIENT
Start: 2020-07-29 | End: 2022-05-10 | Stop reason: SDUPTHER

## 2020-07-29 RX ORDER — OXYCODONE AND ACETAMINOPHEN 5; 325 MG/1; MG/1
1 TABLET ORAL EVERY 4 HOURS PRN
Qty: 28 TABLET | Refills: 0 | Status: SHIPPED | OUTPATIENT
Start: 2020-07-29 | End: 2022-05-10

## 2020-07-29 RX ADMIN — MEASLES, MUMPS, AND RUBELLA VIRUS VACCINE LIVE 0.5 ML: 1000; 12500; 1000 INJECTION, POWDER, LYOPHILIZED, FOR SUSPENSION SUBCUTANEOUS at 09:07

## 2020-07-29 RX ADMIN — IBUPROFEN 800 MG: 800 TABLET, FILM COATED ORAL at 06:07

## 2020-07-29 NOTE — DISCHARGE INSTRUCTIONS
No sex, tampons, douches for 6 weeks  No tub baths, showers only  Return to the ER if you are saturating a full pad within 2 hours  Return to Dr. Garnica in 2 weeks for a follow up appt  No heavy lifting over 10 pounds

## 2020-07-29 NOTE — PLAN OF CARE
07/29/20 1320   Final Note   Assessment Type Final Discharge Note   Anticipated Discharge Disposition Home

## 2020-07-29 NOTE — NURSING
No sex, tampons, douches for 6 weeks  No tub baths, showers only  Return to the ER if you are saturating a full pad within 2 hours  Return to Dr. Garnica in 2 weeks for a follow up appt  No heavy lifting over 10 pound

## 2020-07-29 NOTE — DISCHARGE SUMMARY
[place] - Labor And Delivery  Obstetrics  Discharge Summary      Patient Name: Khris Sheikh  MRN: [id number]  Admission Date: 2020  Hospital Length of Stay: 2 days  Discharge Date and Time:  2020 12:00 PM  Attending Physician: Cindy Garnica MD   Discharging Provider: Cindy Garnica MD   Primary Care Provider: Cindy Garnica MD    HPI: 25 yo  at 39.0 weeks presents for scheduled repeat         Procedure(s) (LRB):   SECTION (N/A)     Hospital Course:   L&D to OR to post-partum         Final Active Diagnoses:    Diagnosis Date Noted POA    PRINCIPAL PROBLEM:   delivery delivered [O82] 2020 No      Problems Resolved During this Admission:    Diagnosis Date Noted Date Resolved POA    Previous  section [Z98.891] 2020 Not Applicable        Significant Diagnostic Studies: Labs:   CBC   Recent Labs   Lab 20  1431   WBC 12.36   HGB 11.8*   HCT 36.4*            Feeding Method: bottle    Immunizations     Date Immunization Status Dose Route/Site Given by    20 Rho (D) Immune Globulin - IM Deleted 300 mcg Intramuscular/     20 0915 MMR Incomplete 0.5 mL Subcutaneous/Left deltoid           Delivery:    Episiotomy:     Lacerations:     Repair suture:     Repair # of packets:     Blood loss (ml):       Birth information:  YOB: 2019   Time of birth: 8:58 AM   Sex: male   Delivery type: , Classical   Gestational Age: <None>    Delivery Clinician:      Other providers:       Additional  information:  Forceps:    Vacuum:    Breech:    Observed anomalies      Living?:           APGARS  One minute Five minutes Ten minutes   Skin color:         Heart rate:         Grimace:         Muscle tone:         Breathing:         Totals: 9  9        Placenta: Delivered:       appearance    Pending Diagnostic Studies:     None          Discharged Condition: good    Disposition: Home or Self Care    Follow Up:  Follow-up  Information     Cindy Garnica MD. Schedule an appointment as soon as possible for a visit in 2 weeks.    Specialty: Obstetrics and Gynecology  Why: wound check  Contact information:  [address] [facility]  [address] [place] [address]  [phone]                 Patient Instructions:   No discharge procedures on file.  Medications:  Current Discharge Medication List      START taking these medications    Details   docusate sodium (COLACE) 100 MG capsule Take 1 capsule (100 mg total) by mouth 2 (two) times daily.  Qty: 60 capsule, Refills: 1      ibuprofen (ADVIL,MOTRIN) 800 MG tablet Take 1 tablet (800 mg total) by mouth every 8 (eight) hours.  Qty: 30 tablet, Refills: 1      oxyCODONE-acetaminophen (PERCOCET) 5-325 mg per tablet Take 1 tablet by mouth every 4 (four) hours as needed for Pain.  Qty: 28 tablet, Refills: 0    Comments: Quantity prescribed more than 7 day supply? No         CONTINUE these medications which have NOT CHANGED    Details   prenatal vit37/iron/folic acid (PRENATA ORAL) Take 1 tablet by mouth once daily.             Cindy Garnica MD  Obstetrics  [place] - Labor And Delivery

## 2020-08-09 ENCOUNTER — NURSE TRIAGE (OUTPATIENT)
Dept: ADMINISTRATIVE | Facility: CLINIC | Age: 25
End: 2020-08-09

## 2020-08-09 NOTE — TELEPHONE ENCOUNTER
Reason for Disposition   Wrong number reached.  Phone number verified.    Protocols used: NO CONTACT OR DUPLICATE CONTACT CALL-A-

## 2020-08-09 NOTE — TELEPHONE ENCOUNTER
This is day 13 post-procedure. Attempted to contact pt without success via all numbers on chart attempted at this time. Chart will be closed per OPPCST ([facility] Post-Procedure COVID Symptom Tracker) management.

## 2022-04-26 ENCOUNTER — OFFICE VISIT (OUTPATIENT)
Dept: OBSTETRICS AND GYNECOLOGY | Facility: CLINIC | Age: 27
End: 2022-04-26
Payer: MEDICAID

## 2022-04-26 ENCOUNTER — PROCEDURE VISIT (OUTPATIENT)
Dept: OBSTETRICS AND GYNECOLOGY | Facility: CLINIC | Age: 27
End: 2022-04-26
Payer: MEDICAID

## 2022-04-26 VITALS
DIASTOLIC BLOOD PRESSURE: 73 MMHG | HEIGHT: 63 IN | WEIGHT: 213 LBS | HEART RATE: 71 BPM | SYSTOLIC BLOOD PRESSURE: 119 MMHG | BODY MASS INDEX: 37.74 KG/M2

## 2022-04-26 DIAGNOSIS — O21.9 NAUSEA AND VOMITING IN PREGNANCY: ICD-10-CM

## 2022-04-26 DIAGNOSIS — Z32.01 POSITIVE PREGNANCY TEST: Primary | ICD-10-CM

## 2022-04-26 DIAGNOSIS — Z98.891 PREVIOUS CESAREAN SECTION: ICD-10-CM

## 2022-04-26 LAB
B-HCG UR QL: POSITIVE
CTP QC/QA: YES

## 2022-04-26 PROCEDURE — 99999 PR PBB SHADOW E&M-EST. PATIENT-LVL II: ICD-10-PCS | Mod: PBBFAC,,, | Performed by: OBSTETRICS & GYNECOLOGY

## 2022-04-26 PROCEDURE — 76801 OB US < 14 WKS SINGLE FETUS: CPT | Mod: PBBFAC | Performed by: OBSTETRICS & GYNECOLOGY

## 2022-04-26 PROCEDURE — 3008F PR BODY MASS INDEX (BMI) DOCUMENTED: ICD-10-PCS | Mod: CPTII,,, | Performed by: OBSTETRICS & GYNECOLOGY

## 2022-04-26 PROCEDURE — 99203 OFFICE O/P NEW LOW 30 MIN: CPT | Mod: 25,S$PBB,TH, | Performed by: OBSTETRICS & GYNECOLOGY

## 2022-04-26 PROCEDURE — 3074F PR MOST RECENT SYSTOLIC BLOOD PRESSURE < 130 MM HG: ICD-10-PCS | Mod: CPTII,,, | Performed by: OBSTETRICS & GYNECOLOGY

## 2022-04-26 PROCEDURE — 99212 OFFICE O/P EST SF 10 MIN: CPT | Mod: PBBFAC,TH | Performed by: OBSTETRICS & GYNECOLOGY

## 2022-04-26 PROCEDURE — 3078F DIAST BP <80 MM HG: CPT | Mod: CPTII,,, | Performed by: OBSTETRICS & GYNECOLOGY

## 2022-04-26 PROCEDURE — 1159F PR MEDICATION LIST DOCUMENTED IN MEDICAL RECORD: ICD-10-PCS | Mod: CPTII,,, | Performed by: OBSTETRICS & GYNECOLOGY

## 2022-04-26 PROCEDURE — 3074F SYST BP LT 130 MM HG: CPT | Mod: CPTII,,, | Performed by: OBSTETRICS & GYNECOLOGY

## 2022-04-26 PROCEDURE — 99203 PR OFFICE/OUTPT VISIT, NEW, LEVL III, 30-44 MIN: ICD-10-PCS | Mod: 25,S$PBB,TH, | Performed by: OBSTETRICS & GYNECOLOGY

## 2022-04-26 PROCEDURE — 3078F PR MOST RECENT DIASTOLIC BLOOD PRESSURE < 80 MM HG: ICD-10-PCS | Mod: CPTII,,, | Performed by: OBSTETRICS & GYNECOLOGY

## 2022-04-26 PROCEDURE — 3008F BODY MASS INDEX DOCD: CPT | Mod: CPTII,,, | Performed by: OBSTETRICS & GYNECOLOGY

## 2022-04-26 PROCEDURE — 76801 US OB/GYN EXTENDED PROCEDURE (VIEWPOINT): ICD-10-PCS | Mod: 26,S$PBB,, | Performed by: OBSTETRICS & GYNECOLOGY

## 2022-04-26 PROCEDURE — 81025 URINE PREGNANCY TEST: CPT | Mod: PBBFAC | Performed by: OBSTETRICS & GYNECOLOGY

## 2022-04-26 PROCEDURE — 1159F MED LIST DOCD IN RCRD: CPT | Mod: CPTII,,, | Performed by: OBSTETRICS & GYNECOLOGY

## 2022-04-26 PROCEDURE — 99999 PR PBB SHADOW E&M-EST. PATIENT-LVL II: CPT | Mod: PBBFAC,,, | Performed by: OBSTETRICS & GYNECOLOGY

## 2022-04-26 RX ORDER — ONDANSETRON 4 MG/1
4 TABLET, ORALLY DISINTEGRATING ORAL EVERY 8 HOURS PRN
Qty: 30 TABLET | Refills: 0 | Status: SHIPPED | OUTPATIENT
Start: 2022-04-26 | End: 2022-05-12 | Stop reason: SDUPTHER

## 2022-04-26 NOTE — PROGRESS NOTES
Subjective:   Patient ID: Khris Sheikh is a 26 y.o. y.o. female.     Chief Complaint: Missed Menses       History of Present Illness:    Khris presents today complaining of amenorrhea. Patient's last menstrual period was 2022.. Prior menstrual cycles have been regular. She admits to Nausea and vomiting. She also denies headache. Home pregnancy test:  not done.        Review of Systems   Constitutional: Negative for chills, fatigue and fever.   Respiratory: Negative for shortness of breath.    Cardiovascular: Negative for chest pain.   Gastrointestinal: Positive for nausea and vomiting. Negative for abdominal pain, constipation and diarrhea.   Genitourinary: Negative for bladder incontinence, dysuria, hot flashes, pelvic pain and vaginal bleeding.   Neurological: Negative for headaches.   Psychiatric/Behavioral: Negative for depression.           Objective:    Vital Signs:  Vitals:    22 0946   BP: 119/73   Pulse: 71     Weight 213 lb  Body mass index is 37.73 kg/m².    Physical Exam:  General:  alert, no distress, morbidly obese   Abdomen:  Soft, nontender   Extremities: No cyanosis, clubbing, edema       UPT:  Positive    Ultrasound:  Viable single intrauterine pregnancy with LEDA 2022      Assessment:      1. Positive pregnancy test    2. Previous  section    3. Nausea and vomiting in pregnancy          Plan:      Positive pregnancy test  -     POCT urine pregnancy    Previous  section    Nausea and vomiting in pregnancy  -     ondansetron (ZOFRAN-ODT) 4 MG TbDL; Take 1 tablet (4 mg total) by mouth every 8 (eight) hours as needed (nausea).  Dispense: 30 tablet; Refill: 0    Return to clinic 2 weeks for new OB      Cindy Garnica MD FACOG    2022  10:04 AM

## 2022-05-10 ENCOUNTER — INITIAL PRENATAL (OUTPATIENT)
Dept: OBSTETRICS AND GYNECOLOGY | Facility: CLINIC | Age: 27
End: 2022-05-10
Payer: MEDICAID

## 2022-05-10 ENCOUNTER — PROCEDURE VISIT (OUTPATIENT)
Dept: OBSTETRICS AND GYNECOLOGY | Facility: CLINIC | Age: 27
End: 2022-05-10
Payer: MEDICAID

## 2022-05-10 VITALS
BODY MASS INDEX: 37.91 KG/M2 | DIASTOLIC BLOOD PRESSURE: 72 MMHG | HEART RATE: 71 BPM | WEIGHT: 214 LBS | SYSTOLIC BLOOD PRESSURE: 132 MMHG

## 2022-05-10 DIAGNOSIS — Z36.89 SCREENING, ANTENATAL, FOR FETAL ANATOMIC SURVEY: Primary | ICD-10-CM

## 2022-05-10 DIAGNOSIS — Z36.89 CONFIRM FETAL CARDIAC ACTIVITY USING ULTRASOUND: Primary | ICD-10-CM

## 2022-05-10 DIAGNOSIS — O21.9 NAUSEA AND VOMITING IN PREGNANCY: ICD-10-CM

## 2022-05-10 DIAGNOSIS — Z3A.10 10 WEEKS GESTATION OF PREGNANCY: ICD-10-CM

## 2022-05-10 DIAGNOSIS — Z98.891 PREVIOUS CESAREAN SECTION: ICD-10-CM

## 2022-05-10 DIAGNOSIS — K59.00 CONSTIPATION, UNSPECIFIED CONSTIPATION TYPE: ICD-10-CM

## 2022-05-10 LAB
ABO + RH BLD: NORMAL
AMPHET+METHAMPHET UR QL: NEGATIVE
BARBITURATES UR QL SCN>200 NG/ML: NEGATIVE
BASOPHILS # BLD AUTO: 0.04 K/UL (ref 0–0.2)
BASOPHILS NFR BLD: 0.4 % (ref 0–1.9)
BENZODIAZ UR QL SCN>200 NG/ML: NEGATIVE
BILIRUB UR QL STRIP: NEGATIVE
BLD GP AB SCN CELLS X3 SERPL QL: NORMAL
BZE UR QL SCN: NEGATIVE
CANNABINOIDS UR QL SCN: ABNORMAL
CLARITY UR: CLEAR
COLOR UR: YELLOW
CREAT UR-MCNC: 96.1 MG/DL (ref 15–325)
DIFFERENTIAL METHOD: NORMAL
EOSINOPHIL # BLD AUTO: 0 K/UL (ref 0–0.5)
EOSINOPHIL NFR BLD: 0.4 % (ref 0–8)
ERYTHROCYTE [DISTWIDTH] IN BLOOD BY AUTOMATED COUNT: 12.5 % (ref 11.5–14.5)
GLUCOSE UR QL STRIP: NEGATIVE
HCT VFR BLD AUTO: 37 % (ref 37–48.5)
HGB BLD-MCNC: 12.6 G/DL (ref 12–16)
HGB UR QL STRIP: NEGATIVE
IMM GRANULOCYTES # BLD AUTO: 0.04 K/UL (ref 0–0.04)
IMM GRANULOCYTES NFR BLD AUTO: 0.4 % (ref 0–0.5)
KETONES UR QL STRIP: NEGATIVE
LEUKOCYTE ESTERASE UR QL STRIP: NEGATIVE
LYMPHOCYTES # BLD AUTO: 2.8 K/UL (ref 1–4.8)
LYMPHOCYTES NFR BLD: 25.9 % (ref 18–48)
MCH RBC QN AUTO: 28.6 PG (ref 27–31)
MCHC RBC AUTO-ENTMCNC: 34.1 G/DL (ref 32–36)
MCV RBC AUTO: 84 FL (ref 82–98)
METHADONE UR QL SCN>300 NG/ML: NEGATIVE
MONOCYTES # BLD AUTO: 0.7 K/UL (ref 0.3–1)
MONOCYTES NFR BLD: 6.1 % (ref 4–15)
NEUTROPHILS # BLD AUTO: 7.1 K/UL (ref 1.8–7.7)
NEUTROPHILS NFR BLD: 66.8 % (ref 38–73)
NITRITE UR QL STRIP: NEGATIVE
NRBC BLD-RTO: 0 /100 WBC
OPIATES UR QL SCN: NEGATIVE
PCP UR QL SCN>25 NG/ML: NEGATIVE
PH UR STRIP: 7 [PH] (ref 5–8)
PLATELET # BLD AUTO: 265 K/UL (ref 150–450)
PMV BLD AUTO: 11.1 FL (ref 9.2–12.9)
PROT UR QL STRIP: NEGATIVE
RBC # BLD AUTO: 4.4 M/UL (ref 4–5.4)
SP GR UR STRIP: 1.01 (ref 1–1.03)
T4 SERPL-MCNC: 13.1 UG/DL (ref 4.5–11.5)
TOXICOLOGY INFORMATION: ABNORMAL
TSH SERPL DL<=0.005 MIU/L-ACNC: 1.13 UIU/ML (ref 0.4–4)
URN SPEC COLLECT METH UR: NORMAL
UROBILINOGEN UR STRIP-ACNC: 1 EU/DL
WBC # BLD AUTO: 10.61 K/UL (ref 3.9–12.7)

## 2022-05-10 PROCEDURE — 87389 HIV-1 AG W/HIV-1&-2 AB AG IA: CPT | Performed by: OBSTETRICS & GYNECOLOGY

## 2022-05-10 PROCEDURE — 76815 OB US LIMITED FETUS(S): CPT | Mod: PBBFAC | Performed by: OBSTETRICS & GYNECOLOGY

## 2022-05-10 PROCEDURE — 84436 ASSAY OF TOTAL THYROXINE: CPT | Performed by: OBSTETRICS & GYNECOLOGY

## 2022-05-10 PROCEDURE — 86803 HEPATITIS C AB TEST: CPT | Performed by: OBSTETRICS & GYNECOLOGY

## 2022-05-10 PROCEDURE — 81003 URINALYSIS AUTO W/O SCOPE: CPT | Mod: 59 | Performed by: OBSTETRICS & GYNECOLOGY

## 2022-05-10 PROCEDURE — 86592 SYPHILIS TEST NON-TREP QUAL: CPT | Performed by: OBSTETRICS & GYNECOLOGY

## 2022-05-10 PROCEDURE — 76815 US OB/GYN EXTENDED PROCEDURE (VIEWPOINT): ICD-10-PCS | Mod: 26,S$PBB,, | Performed by: OBSTETRICS & GYNECOLOGY

## 2022-05-10 PROCEDURE — 86787 VARICELLA-ZOSTER ANTIBODY: CPT | Performed by: OBSTETRICS & GYNECOLOGY

## 2022-05-10 PROCEDURE — 99212 OFFICE O/P EST SF 10 MIN: CPT | Mod: PBBFAC,TH | Performed by: OBSTETRICS & GYNECOLOGY

## 2022-05-10 PROCEDURE — 86901 BLOOD TYPING SEROLOGIC RH(D): CPT | Performed by: OBSTETRICS & GYNECOLOGY

## 2022-05-10 PROCEDURE — 87340 HEPATITIS B SURFACE AG IA: CPT | Performed by: OBSTETRICS & GYNECOLOGY

## 2022-05-10 PROCEDURE — 99213 OFFICE O/P EST LOW 20 MIN: CPT | Mod: 25,TH,S$PBB, | Performed by: OBSTETRICS & GYNECOLOGY

## 2022-05-10 PROCEDURE — 85025 COMPLETE CBC W/AUTO DIFF WBC: CPT | Performed by: OBSTETRICS & GYNECOLOGY

## 2022-05-10 PROCEDURE — 80307 DRUG TEST PRSMV CHEM ANLYZR: CPT | Performed by: OBSTETRICS & GYNECOLOGY

## 2022-05-10 PROCEDURE — 87661 TRICHOMONAS VAGINALIS AMPLIF: CPT | Performed by: OBSTETRICS & GYNECOLOGY

## 2022-05-10 PROCEDURE — 86376 MICROSOMAL ANTIBODY EACH: CPT | Performed by: OBSTETRICS & GYNECOLOGY

## 2022-05-10 PROCEDURE — 99999 PR PBB SHADOW E&M-EST. PATIENT-LVL II: ICD-10-PCS | Mod: PBBFAC,,, | Performed by: OBSTETRICS & GYNECOLOGY

## 2022-05-10 PROCEDURE — 87088 URINE BACTERIA CULTURE: CPT | Performed by: OBSTETRICS & GYNECOLOGY

## 2022-05-10 PROCEDURE — 99213 PR OFFICE/OUTPT VISIT, EST, LEVL III, 20-29 MIN: ICD-10-PCS | Mod: 25,TH,S$PBB, | Performed by: OBSTETRICS & GYNECOLOGY

## 2022-05-10 PROCEDURE — 84443 ASSAY THYROID STIM HORMONE: CPT | Performed by: OBSTETRICS & GYNECOLOGY

## 2022-05-10 PROCEDURE — 99999 PR PBB SHADOW E&M-EST. PATIENT-LVL II: CPT | Mod: PBBFAC,,, | Performed by: OBSTETRICS & GYNECOLOGY

## 2022-05-10 PROCEDURE — 87086 URINE CULTURE/COLONY COUNT: CPT | Performed by: OBSTETRICS & GYNECOLOGY

## 2022-05-10 PROCEDURE — 86762 RUBELLA ANTIBODY: CPT | Performed by: OBSTETRICS & GYNECOLOGY

## 2022-05-10 RX ORDER — DOCUSATE SODIUM 100 MG/1
100 CAPSULE, LIQUID FILLED ORAL 2 TIMES DAILY
Qty: 60 CAPSULE | Refills: 1 | Status: ON HOLD | OUTPATIENT
Start: 2022-05-10 | End: 2022-11-16 | Stop reason: HOSPADM

## 2022-05-10 NOTE — PROGRESS NOTES
New OB visit today.  Patient states the Zofran is helping her nausea tremendously; however, notes constipation.  Recommended Colace and milk of magnesia.  Fetal well-being reassuring the ultrasound.  All questions answered and precautions given.  Will follow up labs.  Return to clinic in 4 weeks

## 2022-05-11 LAB
RPR SER QL: NORMAL
RUBV IGG SER-ACNC: 22.4 IU/ML
RUBV IGG SER-IMP: REACTIVE
THYROPEROXIDASE IGG SERPL-ACNC: 10.9 IU/ML

## 2022-05-12 ENCOUNTER — TELEPHONE (OUTPATIENT)
Dept: OBSTETRICS AND GYNECOLOGY | Facility: CLINIC | Age: 27
End: 2022-05-12
Payer: MEDICAID

## 2022-05-12 DIAGNOSIS — O21.9 NAUSEA AND VOMITING IN PREGNANCY: ICD-10-CM

## 2022-05-12 LAB
CHLAMYDIA/N. GONORROHEAE AMP DNA: NORMAL
TRICHOMONAS VAGINALIS, DNA, URINE: NORMAL
VARICELLA INTERPRETATION: POSITIVE
VARICELLA ZOSTER IGG: 1.92 ISR (ref 0–0.9)

## 2022-05-12 RX ORDER — ONDANSETRON 4 MG/1
4 TABLET, ORALLY DISINTEGRATING ORAL EVERY 8 HOURS PRN
Qty: 30 TABLET | Refills: 0 | Status: SHIPPED | OUTPATIENT
Start: 2022-05-12 | End: 2022-05-25 | Stop reason: SDUPTHER

## 2022-05-13 LAB — BACTERIA UR CULT: ABNORMAL

## 2022-05-16 RX ORDER — NITROFURANTOIN 25; 75 MG/1; MG/1
100 CAPSULE ORAL 2 TIMES DAILY
Qty: 14 CAPSULE | Refills: 0 | Status: SHIPPED | OUTPATIENT
Start: 2022-05-16 | End: 2022-05-23

## 2022-05-17 LAB
HBV SURFACE AG SERPL QL IA: NEGATIVE
HCV AB SERPL QL IA: NEGATIVE

## 2022-05-18 LAB — HIV 1+2 AB+HIV1 P24 AG SERPL QL IA: NEGATIVE

## 2022-05-25 ENCOUNTER — TELEPHONE (OUTPATIENT)
Dept: OBSTETRICS AND GYNECOLOGY | Facility: CLINIC | Age: 27
End: 2022-05-25
Payer: MEDICAID

## 2022-05-25 DIAGNOSIS — O21.9 NAUSEA AND VOMITING IN PREGNANCY: ICD-10-CM

## 2022-05-25 RX ORDER — ONDANSETRON 4 MG/1
4 TABLET, ORALLY DISINTEGRATING ORAL EVERY 8 HOURS PRN
Qty: 30 TABLET | Refills: 0 | Status: SHIPPED | OUTPATIENT
Start: 2022-05-25 | End: 2022-06-09 | Stop reason: SDUPTHER

## 2022-06-09 DIAGNOSIS — O21.9 NAUSEA AND VOMITING IN PREGNANCY: ICD-10-CM

## 2022-06-09 RX ORDER — ONDANSETRON 4 MG/1
4 TABLET, ORALLY DISINTEGRATING ORAL EVERY 8 HOURS PRN
Qty: 30 TABLET | Refills: 0 | Status: SHIPPED | OUTPATIENT
Start: 2022-06-09 | End: 2022-06-27 | Stop reason: SDUPTHER

## 2022-06-14 ENCOUNTER — ROUTINE PRENATAL (OUTPATIENT)
Dept: OBSTETRICS AND GYNECOLOGY | Facility: CLINIC | Age: 27
End: 2022-06-14
Payer: MEDICAID

## 2022-06-14 VITALS
DIASTOLIC BLOOD PRESSURE: 81 MMHG | HEART RATE: 101 BPM | BODY MASS INDEX: 37.73 KG/M2 | SYSTOLIC BLOOD PRESSURE: 138 MMHG | WEIGHT: 213 LBS

## 2022-06-14 DIAGNOSIS — O21.9 NAUSEA AND VOMITING IN PREGNANCY: ICD-10-CM

## 2022-06-14 DIAGNOSIS — K59.00 CONSTIPATION, UNSPECIFIED CONSTIPATION TYPE: ICD-10-CM

## 2022-06-14 DIAGNOSIS — Z3A.15 15 WEEKS GESTATION OF PREGNANCY: Primary | ICD-10-CM

## 2022-06-14 DIAGNOSIS — Z98.891 PREVIOUS CESAREAN SECTION: ICD-10-CM

## 2022-06-14 PROCEDURE — 99999 PR PBB SHADOW E&M-EST. PATIENT-LVL II: ICD-10-PCS | Mod: PBBFAC,,, | Performed by: OBSTETRICS & GYNECOLOGY

## 2022-06-14 PROCEDURE — 99213 PR OFFICE/OUTPT VISIT, EST, LEVL III, 20-29 MIN: ICD-10-PCS | Mod: TH,S$PBB,, | Performed by: OBSTETRICS & GYNECOLOGY

## 2022-06-14 PROCEDURE — 99213 OFFICE O/P EST LOW 20 MIN: CPT | Mod: TH,S$PBB,, | Performed by: OBSTETRICS & GYNECOLOGY

## 2022-06-14 PROCEDURE — 99212 OFFICE O/P EST SF 10 MIN: CPT | Mod: PBBFAC,TH | Performed by: OBSTETRICS & GYNECOLOGY

## 2022-06-14 PROCEDURE — 99999 PR PBB SHADOW E&M-EST. PATIENT-LVL II: CPT | Mod: PBBFAC,,, | Performed by: OBSTETRICS & GYNECOLOGY

## 2022-06-14 NOTE — PROGRESS NOTES
No complaints today.  Patient states feeling well.  States getting enough rest.  Nausea is subsiding.  All questions answered and precautions given.  Anatomy scan and AFP at next visit

## 2022-06-27 DIAGNOSIS — O21.9 NAUSEA AND VOMITING IN PREGNANCY: ICD-10-CM

## 2022-06-27 RX ORDER — ONDANSETRON 4 MG/1
4 TABLET, ORALLY DISINTEGRATING ORAL EVERY 8 HOURS PRN
Qty: 30 TABLET | Refills: 0 | Status: SHIPPED | OUTPATIENT
Start: 2022-06-27 | End: 2022-07-14 | Stop reason: SDUPTHER

## 2022-07-14 ENCOUNTER — TELEPHONE (OUTPATIENT)
Dept: OBSTETRICS AND GYNECOLOGY | Facility: CLINIC | Age: 27
End: 2022-07-14
Payer: MEDICAID

## 2022-07-14 DIAGNOSIS — O21.9 NAUSEA AND VOMITING IN PREGNANCY: ICD-10-CM

## 2022-07-14 RX ORDER — ONDANSETRON 4 MG/1
4 TABLET, ORALLY DISINTEGRATING ORAL EVERY 8 HOURS PRN
Qty: 30 TABLET | Refills: 0 | Status: SHIPPED | OUTPATIENT
Start: 2022-07-14 | End: 2022-08-03 | Stop reason: SDUPTHER

## 2022-07-26 ENCOUNTER — PROCEDURE VISIT (OUTPATIENT)
Dept: OBSTETRICS AND GYNECOLOGY | Facility: CLINIC | Age: 27
End: 2022-07-26
Payer: MEDICAID

## 2022-07-26 ENCOUNTER — ROUTINE PRENATAL (OUTPATIENT)
Dept: OBSTETRICS AND GYNECOLOGY | Facility: CLINIC | Age: 27
End: 2022-07-26
Payer: MEDICAID

## 2022-07-26 VITALS
DIASTOLIC BLOOD PRESSURE: 76 MMHG | WEIGHT: 212 LBS | BODY MASS INDEX: 37.55 KG/M2 | HEART RATE: 84 BPM | SYSTOLIC BLOOD PRESSURE: 124 MMHG

## 2022-07-26 DIAGNOSIS — Z36.89 SCREENING, ANTENATAL, FOR FETAL ANATOMIC SURVEY: Primary | ICD-10-CM

## 2022-07-26 DIAGNOSIS — O99.212 OBESITY AFFECTING PREGNANCY IN SECOND TRIMESTER: ICD-10-CM

## 2022-07-26 DIAGNOSIS — O21.9 NAUSEA AND VOMITING IN PREGNANCY: ICD-10-CM

## 2022-07-26 DIAGNOSIS — Z3A.21 21 WEEKS GESTATION OF PREGNANCY: ICD-10-CM

## 2022-07-26 DIAGNOSIS — O26.899 PREGNANCY COMPLICATED BY UMBILICAL CORD VARIX IN ANTEPARTUM PERIOD: Primary | ICD-10-CM

## 2022-07-26 DIAGNOSIS — F12.10 TETRAHYDROCANNABINOL (THC) USE DISORDER, MILD, ABUSE: ICD-10-CM

## 2022-07-26 DIAGNOSIS — O28.3 ABNORMAL PRENATAL ULTRASOUND: ICD-10-CM

## 2022-07-26 DIAGNOSIS — Z98.891 PREVIOUS CESAREAN SECTION: ICD-10-CM

## 2022-07-26 LAB
AMPHET+METHAMPHET UR QL: NEGATIVE
BARBITURATES UR QL SCN>200 NG/ML: NEGATIVE
BENZODIAZ UR QL SCN>200 NG/ML: NEGATIVE
BZE UR QL SCN: NEGATIVE
CANNABINOIDS UR QL SCN: ABNORMAL
CREAT UR-MCNC: 389 MG/DL (ref 15–325)
METHADONE UR QL SCN>300 NG/ML: NEGATIVE
OPIATES UR QL SCN: NEGATIVE
PCP UR QL SCN>25 NG/ML: NEGATIVE
TOXICOLOGY INFORMATION: ABNORMAL

## 2022-07-26 PROCEDURE — 76805 OB US >/= 14 WKS SNGL FETUS: CPT | Mod: PBBFAC | Performed by: OBSTETRICS & GYNECOLOGY

## 2022-07-26 PROCEDURE — 99214 PR OFFICE/OUTPT VISIT, EST, LEVL IV, 30-39 MIN: ICD-10-PCS | Mod: 25,TH,S$PBB, | Performed by: OBSTETRICS & GYNECOLOGY

## 2022-07-26 PROCEDURE — 82105 ALPHA-FETOPROTEIN SERUM: CPT | Performed by: OBSTETRICS & GYNECOLOGY

## 2022-07-26 PROCEDURE — 99214 OFFICE O/P EST MOD 30 MIN: CPT | Mod: 25,TH,S$PBB, | Performed by: OBSTETRICS & GYNECOLOGY

## 2022-07-26 PROCEDURE — 99999 PR PBB SHADOW E&M-EST. PATIENT-LVL II: CPT | Mod: PBBFAC,,, | Performed by: OBSTETRICS & GYNECOLOGY

## 2022-07-26 PROCEDURE — 99999 PR PBB SHADOW E&M-EST. PATIENT-LVL II: ICD-10-PCS | Mod: PBBFAC,,, | Performed by: OBSTETRICS & GYNECOLOGY

## 2022-07-26 PROCEDURE — 76805 US OB/GYN EXTENDED PROCEDURE (VIEWPOINT): ICD-10-PCS | Mod: 26,S$PBB,, | Performed by: OBSTETRICS & GYNECOLOGY

## 2022-07-26 PROCEDURE — 80307 DRUG TEST PRSMV CHEM ANLYZR: CPT | Performed by: OBSTETRICS & GYNECOLOGY

## 2022-07-26 PROCEDURE — 99212 OFFICE O/P EST SF 10 MIN: CPT | Mod: PBBFAC,TH,25 | Performed by: OBSTETRICS & GYNECOLOGY

## 2022-07-26 NOTE — PROGRESS NOTES
AFP today.  Patient recently recovering from COVID, states that she is still having nausea and vomiting, but not as often.  She states she only has to take 1 nausea pill daily now.    Ultrasound today shows normal fetal anatomy; however, there is evidence of a possible distended umbilical vein vs varicosity.  Discussion of issue and reassurance given to patient.  Will refer to [facility] for further evaluation.  All questions answered.  Return to clinic in 4 weeks for follow-up here

## 2022-07-29 LAB
# FETUSES US: NORMAL
AFP INTERPRETATION: NORMAL
AFP MOM SERPL: 0.76
AFP SERPL-MCNC: 51 NG/ML
AFP SERPL-MCNC: NEGATIVE NG/ML
AGE AT DELIVERY: 26
GA (DAYS): 4 D
GA (WEEKS): 21 WK
GESTATIONAL AGE METHOD: NORMAL
IDDM PATIENT QL: NORMAL
SMOKING STATUS FTND: NO

## 2022-08-03 DIAGNOSIS — O21.9 NAUSEA AND VOMITING IN PREGNANCY: ICD-10-CM

## 2022-08-03 RX ORDER — ONDANSETRON 4 MG/1
4 TABLET, ORALLY DISINTEGRATING ORAL EVERY 8 HOURS PRN
Qty: 30 TABLET | Refills: 0 | Status: SHIPPED | OUTPATIENT
Start: 2022-08-03 | End: 2022-08-05 | Stop reason: SDUPTHER

## 2022-08-05 DIAGNOSIS — O21.9 NAUSEA AND VOMITING IN PREGNANCY: ICD-10-CM

## 2022-08-05 RX ORDER — ONDANSETRON 4 MG/1
4 TABLET, ORALLY DISINTEGRATING ORAL EVERY 8 HOURS PRN
Qty: 30 TABLET | Refills: 0 | Status: SHIPPED | OUTPATIENT
Start: 2022-08-05 | End: 2022-08-23 | Stop reason: SDUPTHER

## 2022-08-11 ENCOUNTER — OFFICE VISIT (OUTPATIENT)
Dept: MATERNAL FETAL MEDICINE | Facility: CLINIC | Age: 27
End: 2022-08-11
Payer: MEDICAID

## 2022-08-11 ENCOUNTER — PROCEDURE VISIT (OUTPATIENT)
Dept: MATERNAL FETAL MEDICINE | Facility: CLINIC | Age: 27
End: 2022-08-11
Payer: MEDICAID

## 2022-08-11 VITALS
BODY MASS INDEX: 38.13 KG/M2 | WEIGHT: 215.19 LBS | HEIGHT: 63 IN | DIASTOLIC BLOOD PRESSURE: 63 MMHG | SYSTOLIC BLOOD PRESSURE: 127 MMHG | HEART RATE: 97 BPM

## 2022-08-11 DIAGNOSIS — O99.212 OBESITY AFFECTING PREGNANCY IN SECOND TRIMESTER: ICD-10-CM

## 2022-08-11 DIAGNOSIS — O36.8990 UMBILICAL CORD CYST DURING PREGNANCY, ANTEPARTUM: ICD-10-CM

## 2022-08-11 DIAGNOSIS — O26.899 PREGNANCY COMPLICATED BY UMBILICAL CORD VARIX IN ANTEPARTUM PERIOD: ICD-10-CM

## 2022-08-11 DIAGNOSIS — F12.10 TETRAHYDROCANNABINOL (THC) USE DISORDER, MILD, ABUSE: ICD-10-CM

## 2022-08-11 DIAGNOSIS — O99.322 DRUG USE AFFECTING PREGNANCY IN SECOND TRIMESTER: ICD-10-CM

## 2022-08-11 PROCEDURE — 3008F PR BODY MASS INDEX (BMI) DOCUMENTED: ICD-10-PCS | Mod: CPTII,S$GLB,, | Performed by: OBSTETRICS & GYNECOLOGY

## 2022-08-11 PROCEDURE — 1159F MED LIST DOCD IN RCRD: CPT | Mod: CPTII,S$GLB,, | Performed by: OBSTETRICS & GYNECOLOGY

## 2022-08-11 PROCEDURE — 3074F PR MOST RECENT SYSTOLIC BLOOD PRESSURE < 130 MM HG: ICD-10-PCS | Mod: CPTII,S$GLB,, | Performed by: OBSTETRICS & GYNECOLOGY

## 2022-08-11 PROCEDURE — 99204 OFFICE O/P NEW MOD 45 MIN: CPT | Mod: 25,TH,S$GLB, | Performed by: OBSTETRICS & GYNECOLOGY

## 2022-08-11 PROCEDURE — 1159F PR MEDICATION LIST DOCUMENTED IN MEDICAL RECORD: ICD-10-PCS | Mod: CPTII,S$GLB,, | Performed by: OBSTETRICS & GYNECOLOGY

## 2022-08-11 PROCEDURE — 3074F SYST BP LT 130 MM HG: CPT | Mod: CPTII,S$GLB,, | Performed by: OBSTETRICS & GYNECOLOGY

## 2022-08-11 PROCEDURE — 3078F DIAST BP <80 MM HG: CPT | Mod: CPTII,S$GLB,, | Performed by: OBSTETRICS & GYNECOLOGY

## 2022-08-11 PROCEDURE — 99204 PR OFFICE/OUTPT VISIT, NEW, LEVL IV, 45-59 MIN: ICD-10-PCS | Mod: 25,TH,S$GLB, | Performed by: OBSTETRICS & GYNECOLOGY

## 2022-08-11 PROCEDURE — 76811 OB US DETAILED SNGL FETUS: CPT | Mod: S$GLB,,, | Performed by: OBSTETRICS & GYNECOLOGY

## 2022-08-11 PROCEDURE — 3008F BODY MASS INDEX DOCD: CPT | Mod: CPTII,S$GLB,, | Performed by: OBSTETRICS & GYNECOLOGY

## 2022-08-11 PROCEDURE — 3078F PR MOST RECENT DIASTOLIC BLOOD PRESSURE < 80 MM HG: ICD-10-PCS | Mod: CPTII,S$GLB,, | Performed by: OBSTETRICS & GYNECOLOGY

## 2022-08-11 PROCEDURE — 76811 PR US, OB FETAL EVAL & EXAM, TRANSABDOM,FIRST GESTATION: ICD-10-PCS | Mod: S$GLB,,, | Performed by: OBSTETRICS & GYNECOLOGY

## 2022-08-11 NOTE — ASSESSMENT & PLAN NOTE
A multiloculated umbilical cord cyst is noted near the fetal cord insertion site.  The cyst measures approximately 2 x 2.2 x 2.3 cm.  Some areas of the cyst appear to have some heterogeneous echogenicity.  There is no apparent connection to an intra-abdominal fluid collection.  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  This could also represent myxoid degeneration of Joseluis's jelly within the cord.  There are no associated structural abnormalities and she has had low risk cell free DNA screening.  Given this, I counseled her that this is generally thought to be a benign finding, but some reports have found in association with growth restriction and other adverse pregnancy outcomes.  Most of the adverse outcome seen limited to cases in which there are associated anomalies.  I explained that there is no indication for any intervention or alteration in care at this point other than following fetal growth and reassessing the cyst periodically.    Recommendations:   -Serial growth ultrasounds every 4-6 weeks (scheduled through our office)

## 2022-08-11 NOTE — PROGRESS NOTES
MATERNAL-FETAL MEDICINE   CONSULT NOTE    Provider requesting consultation: Dr Garnica    SUBJECTIVE:     Ms. Khris Sheikh is a 26 y.o.  female with IUP at 23w6d who is seen in consultation by MFM for evaluation and management of:  Problem   Marijuana use affecting pregnancy in second trimester   Obesity Affecting Pregnancy in Second Trimester   Umbilical Cord Cyst During Pregnancy, Antepartum            Medication List with Changes/Refills   Current Medications    DOCUSATE SODIUM (COLACE) 100 MG CAPSULE    Take 1 capsule (100 mg total) by mouth 2 (two) times daily.    ONDANSETRON (ZOFRAN-ODT) 4 MG TBDL    Take 1 tablet (4 mg total) by mouth every 8 (eight) hours as needed (nausea).    PRENATAL VIT37/IRON/FOLIC ACID (PRENATA ORAL)    Take 1 tablet by mouth once daily.       Review of patient's allergies indicates:  No Known Allergies      PMH:  Past Medical History:   Diagnosis Date    Gestational diabetes        PObHx:  OB History    Para Term  AB Living   3 2 2 0 0 2   SAB IAB Ectopic Multiple Live Births   0 0 0 0 2      # Outcome Date GA Lbr Reinaldo/2nd Weight Sex Delivery Anes PTL Lv   3 Current            2 Term 20 39w0d  3.487 kg (7 lb 11 oz) M CS-LTranv Spinal  PRIYA   1 Term 14 39w0d  3.033 kg (6 lb 11 oz) M CS-LTranv EPI N PRIYA       PSH:  Past Surgical History:   Procedure Laterality Date     SECTION       SECTION N/A 2020    Procedure:  SECTION;  Surgeon: Cindy Garnica MD;  Location: [facility];  Service: OB/GYN;  Laterality: N/A;       Family history:family history includes Diabetes in her father and mother; Heart failure in her mother; Lung disease in her father.    Social history: reports that she has never smoked. She has never used smokeless tobacco. She reports previous alcohol use. She reports current drug use. Drug: Marijuana.    Genetic history: The patient denies any inherited genetic diseases or birth defects in herself or her partner's  "personal history or family.    Objective:   /63 (BP Location: Left arm)   Pulse 97   Ht 5' 3" (1.6 m)   Wt 97.6 kg (215 lb 2.7 oz)   LMP 2022   BMI 38.12 kg/m²     Ultrasound performed. See viewpoint for full ultrasound report.      Significant labs/imaging: low risk NIPT and AFP      ASSESSMENT/PLAN:     26 y.o.  female with IUP at 23w6d     Marijuana use affecting pregnancy in second trimester  There are no known benefits of marijuana use in pregnancy, and there is no known safe amount of cannabis in pregnancy. There are possible risks, however impact is unclear and sometimes conflicting due in part to confounding by other substance use. Women who smoke marijuana at least weekly are at higher risk of delivering a small for gestational age infant. Additionally, several studies note smaller birth lengths and head circumference in exposed children. Children exposed to marijuana in utero may have lower scores on reading and spelling tests and have been shown to be at higher risk for substance abuse and developmental abnormalities (especially in visuospatial function and decreased attention span/behavioral problems). Women should avoid marijuana use while breastfeeding. Patient was counseled regarding these risks and cessation was encouraged. She states she last used marijuana in May.      Obesity affecting pregnancy in second trimester  The patient was counseled on the  risks associated with obesity which include and increased risk of hypertension, preeclampsia, gestational diabetes, venous thromboembolic disease,  delivery, macrosomia, IUFD, longer first stage of labor, decreased TOLAC success rate, emergent & scheduled  & complications of .  Obesity is also associated with fetal anomalies, specifically neural tube defects.  Studies have shown that the rate of complication increases with rising BMI and thus pregnancies with maternal morbid obesity are at " increased risk.      Recommendations:   TWG goal is 11-20 lbs   Screen for signs/symptoms of obstructive sleep apnea   Nutritionist consult offered (this is to be ordered by primary OB provider)   Start low dose aspirin 81 mg daily at 12-16 weeks for preeclampsia risk reduction   Targeted anatomical survey scheduled at 18-20 weeks   Fetal growth ultrasound at 32 weeks if BMI >= 40   Weekly  testing at 32 weeks if pre-pregnancy BMI >= 45   Lovenox 40 mg BID for VTE prophylaxis while admitted to the hospital (antepartum or postpartum) if BMI >= 40.    Encouraged breastfeeding   Postpartum lifestyle modifications & weight loss      Umbilical cord cyst during pregnancy, antepartum  A multiloculated umbilical cord cyst is noted near the fetal cord insertion site.  The cyst measures approximately 2 x 2.2 x 2.3 cm.  Some areas of the cyst appear to have some heterogeneous echogenicity.  There is no apparent connection to an intra-abdominal fluid collection.  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  This could also represent myxoid degeneration of [place]'s jelly within the cord.  There are no associated structural abnormalities and she has had low risk cell free DNA screening.  Given this, I counseled her that this is generally thought to be a benign finding, but some reports have found in association with growth restriction and other adverse pregnancy outcomes.  Most of the adverse outcome seen limited to cases in which there are associated anomalies.  I explained that there is no indication for any intervention or alteration in care at this point other than following fetal growth and reassessing the cyst periodically.    Recommendations:   -Serial growth ultrasounds every 4-6 weeks (scheduled through our office)          FOLLOW UP:   F/u in 4 weeks for US/MFM visit      45 minutes of  total time spent on the encounter, which includes face to face time and non-face to face time preparing to see the patient (eg, review of tests), obtaining and/or reviewing separately obtained history, documenting clinical information in the electronic or other health record, independently interpreting results (not separately reported) and communicating results to the patient/family/caregiver, or care coordination (not separately reported).      Jamar Kelley  Maternal-Fetal Medicine    Electronically Signed by Jamar Kelley August 11, 2022

## 2022-08-11 NOTE — ASSESSMENT & PLAN NOTE
The patient was counseled on the  risks associated with obesity which include and increased risk of hypertension, preeclampsia, gestational diabetes, venous thromboembolic disease,  delivery, macrosomia, IUFD, longer first stage of labor, decreased TOLAC success rate, emergent & scheduled  & complications of .  Obesity is also associated with fetal anomalies, specifically neural tube defects.  Studies have shown that the rate of complication increases with rising BMI and thus pregnancies with maternal morbid obesity are at increased risk.      Recommendations:   TWG goal is 11-20 lbs   Screen for signs/symptoms of obstructive sleep apnea   Nutritionist consult offered (this is to be ordered by primary OB provider)   Start low dose aspirin 81 mg daily at 12-16 weeks for preeclampsia risk reduction   Targeted anatomical survey scheduled at 18-20 weeks   Fetal growth ultrasound at 32 weeks if BMI >= 40   Weekly  testing at 32 weeks if pre-pregnancy BMI >= 45   Lovenox 40 mg BID for VTE prophylaxis while admitted to the hospital (antepartum or postpartum) if BMI >= 40.    Encouraged breastfeeding   Postpartum lifestyle modifications & weight loss

## 2022-08-23 ENCOUNTER — TELEPHONE (OUTPATIENT)
Dept: OBSTETRICS AND GYNECOLOGY | Facility: CLINIC | Age: 27
End: 2022-08-23
Payer: MEDICAID

## 2022-08-23 DIAGNOSIS — O21.9 NAUSEA AND VOMITING IN PREGNANCY: ICD-10-CM

## 2022-08-23 RX ORDER — ONDANSETRON 4 MG/1
4 TABLET, ORALLY DISINTEGRATING ORAL EVERY 8 HOURS PRN
Qty: 30 TABLET | Refills: 0 | Status: SHIPPED | OUTPATIENT
Start: 2022-08-23 | End: 2022-09-19 | Stop reason: SDUPTHER

## 2022-08-25 ENCOUNTER — ROUTINE PRENATAL (OUTPATIENT)
Dept: OBSTETRICS AND GYNECOLOGY | Facility: CLINIC | Age: 27
End: 2022-08-25
Payer: MEDICAID

## 2022-08-25 VITALS
SYSTOLIC BLOOD PRESSURE: 138 MMHG | HEART RATE: 72 BPM | BODY MASS INDEX: 38.62 KG/M2 | WEIGHT: 218 LBS | DIASTOLIC BLOOD PRESSURE: 72 MMHG

## 2022-08-25 DIAGNOSIS — Z3A.25 25 WEEKS GESTATION OF PREGNANCY: Primary | ICD-10-CM

## 2022-08-25 DIAGNOSIS — Z98.891 PREVIOUS CESAREAN SECTION: ICD-10-CM

## 2022-08-25 PROCEDURE — 99999 PR PBB SHADOW E&M-EST. PATIENT-LVL II: ICD-10-PCS | Mod: PBBFAC,,, | Performed by: OBSTETRICS & GYNECOLOGY

## 2022-08-25 PROCEDURE — 99213 OFFICE O/P EST LOW 20 MIN: CPT | Mod: TH,S$PBB,, | Performed by: OBSTETRICS & GYNECOLOGY

## 2022-08-25 PROCEDURE — 99999 PR PBB SHADOW E&M-EST. PATIENT-LVL II: CPT | Mod: PBBFAC,,, | Performed by: OBSTETRICS & GYNECOLOGY

## 2022-08-25 PROCEDURE — 99212 OFFICE O/P EST SF 10 MIN: CPT | Mod: PBBFAC | Performed by: OBSTETRICS & GYNECOLOGY

## 2022-08-25 PROCEDURE — 99213 PR OFFICE/OUTPT VISIT, EST, LEVL III, 20-29 MIN: ICD-10-PCS | Mod: TH,S$PBB,, | Performed by: OBSTETRICS & GYNECOLOGY

## 2022-08-25 NOTE — PROGRESS NOTES
Interested in tubal ligation.  States the specialist told her that it is just a cyst on the umbilical cord and should not get any bigger like to continue to watch it.  Patient states has no complaints today.  All questions answered and precautions given.  Glucola and growth scan at next visit along with signing of tubal consent

## 2022-09-08 ENCOUNTER — PROCEDURE VISIT (OUTPATIENT)
Dept: OBSTETRICS AND GYNECOLOGY | Facility: CLINIC | Age: 27
End: 2022-09-08
Payer: MEDICAID

## 2022-09-08 ENCOUNTER — ROUTINE PRENATAL (OUTPATIENT)
Dept: OBSTETRICS AND GYNECOLOGY | Facility: CLINIC | Age: 27
End: 2022-09-08
Payer: MEDICAID

## 2022-09-08 VITALS
SYSTOLIC BLOOD PRESSURE: 138 MMHG | HEART RATE: 93 BPM | BODY MASS INDEX: 38.97 KG/M2 | DIASTOLIC BLOOD PRESSURE: 78 MMHG | WEIGHT: 220 LBS

## 2022-09-08 DIAGNOSIS — Z36.89 ENCOUNTER FOR ULTRASOUND TO ASSESS FETAL GROWTH: Primary | ICD-10-CM

## 2022-09-08 DIAGNOSIS — Z98.891 PREVIOUS CESAREAN SECTION: ICD-10-CM

## 2022-09-08 DIAGNOSIS — Z3A.27 27 WEEKS GESTATION OF PREGNANCY: ICD-10-CM

## 2022-09-08 DIAGNOSIS — Z36.89 ENCOUNTER FOR ULTRASOUND TO ASSESS FETAL GROWTH: ICD-10-CM

## 2022-09-08 DIAGNOSIS — Z36.89 SCREENING, ANTENATAL, FOR FETAL ANATOMIC SURVEY: Primary | ICD-10-CM

## 2022-09-08 LAB
BASOPHILS # BLD AUTO: 0.03 K/UL (ref 0–0.2)
BASOPHILS NFR BLD: 0.2 % (ref 0–1.9)
DIFFERENTIAL METHOD: ABNORMAL
EOSINOPHIL # BLD AUTO: 0.1 K/UL (ref 0–0.5)
EOSINOPHIL NFR BLD: 0.6 % (ref 0–8)
ERYTHROCYTE [DISTWIDTH] IN BLOOD BY AUTOMATED COUNT: 12.4 % (ref 11.5–14.5)
GLUCOSE SERPL-MCNC: 148 MG/DL (ref 70–140)
HCT VFR BLD AUTO: 35.2 % (ref 37–48.5)
HGB BLD-MCNC: 11.5 G/DL (ref 12–16)
IMM GRANULOCYTES # BLD AUTO: 0.09 K/UL (ref 0–0.04)
IMM GRANULOCYTES NFR BLD AUTO: 0.7 % (ref 0–0.5)
LYMPHOCYTES # BLD AUTO: 2.6 K/UL (ref 1–4.8)
LYMPHOCYTES NFR BLD: 19.5 % (ref 18–48)
MCH RBC QN AUTO: 27.8 PG (ref 27–31)
MCHC RBC AUTO-ENTMCNC: 32.7 G/DL (ref 32–36)
MCV RBC AUTO: 85 FL (ref 82–98)
MONOCYTES # BLD AUTO: 0.9 K/UL (ref 0.3–1)
MONOCYTES NFR BLD: 6.5 % (ref 4–15)
NEUTROPHILS # BLD AUTO: 9.5 K/UL (ref 1.8–7.7)
NEUTROPHILS NFR BLD: 72.5 % (ref 38–73)
NRBC BLD-RTO: 0 /100 WBC
PLATELET # BLD AUTO: 260 K/UL (ref 150–450)
PMV BLD AUTO: 11.7 FL (ref 9.2–12.9)
RBC # BLD AUTO: 4.13 M/UL (ref 4–5.4)
WBC # BLD AUTO: 13.13 K/UL (ref 3.9–12.7)

## 2022-09-08 PROCEDURE — 86592 SYPHILIS TEST NON-TREP QUAL: CPT | Performed by: OBSTETRICS & GYNECOLOGY

## 2022-09-08 PROCEDURE — 87389 HIV-1 AG W/HIV-1&-2 AB AG IA: CPT | Performed by: OBSTETRICS & GYNECOLOGY

## 2022-09-08 PROCEDURE — 99213 OFFICE O/P EST LOW 20 MIN: CPT | Mod: 25,TH,S$PBB, | Performed by: OBSTETRICS & GYNECOLOGY

## 2022-09-08 PROCEDURE — 99213 PR OFFICE/OUTPT VISIT, EST, LEVL III, 20-29 MIN: ICD-10-PCS | Mod: 25,TH,S$PBB, | Performed by: OBSTETRICS & GYNECOLOGY

## 2022-09-08 PROCEDURE — 76816 US OB/GYN EXTENDED PROCEDURE (VIEWPOINT): ICD-10-PCS | Mod: 26,S$PBB,, | Performed by: OBSTETRICS & GYNECOLOGY

## 2022-09-08 PROCEDURE — 99999 PR PBB SHADOW E&M-EST. PATIENT-LVL II: CPT | Mod: PBBFAC,,, | Performed by: OBSTETRICS & GYNECOLOGY

## 2022-09-08 PROCEDURE — 85025 COMPLETE CBC W/AUTO DIFF WBC: CPT | Performed by: OBSTETRICS & GYNECOLOGY

## 2022-09-08 PROCEDURE — 99999 PR PBB SHADOW E&M-EST. PATIENT-LVL II: ICD-10-PCS | Mod: PBBFAC,,, | Performed by: OBSTETRICS & GYNECOLOGY

## 2022-09-08 PROCEDURE — 82950 GLUCOSE TEST: CPT | Performed by: OBSTETRICS & GYNECOLOGY

## 2022-09-08 PROCEDURE — 99212 OFFICE O/P EST SF 10 MIN: CPT | Mod: PBBFAC,TH | Performed by: OBSTETRICS & GYNECOLOGY

## 2022-09-08 PROCEDURE — 76816 OB US FOLLOW-UP PER FETUS: CPT | Mod: PBBFAC | Performed by: OBSTETRICS & GYNECOLOGY

## 2022-09-08 NOTE — PROGRESS NOTES
No complaints today.  Patient states she is excited as she has actually started to gain some weight.  Ultrasound today shows good interval fetal growth.  Glucola done.  Medicaid tubal consents obtained today.  All questions answered.  Return to clinic in 2 weeks

## 2022-09-09 LAB
HIV 1+2 AB+HIV1 P24 AG SERPL QL IA: NORMAL
RPR SER QL: NORMAL

## 2022-09-13 ENCOUNTER — CLINICAL SUPPORT (OUTPATIENT)
Dept: OBSTETRICS AND GYNECOLOGY | Facility: CLINIC | Age: 27
End: 2022-09-13
Payer: MEDICAID

## 2022-09-13 DIAGNOSIS — O99.810 ABNORMAL GLUCOSE TOLERANCE TEST IN PREGNANCY: Primary | ICD-10-CM

## 2022-09-13 LAB
GLUCOSE SERPL-MCNC: 143 MG/DL
GLUCOSE SERPL-MCNC: 224 MG/DL
GLUCOSE SERPL-MCNC: 48 MG/DL
GLUCOSE SERPL-MCNC: 73 MG/DL (ref 70–110)

## 2022-09-13 PROCEDURE — 82951 GLUCOSE TOLERANCE TEST (GTT): CPT | Performed by: OBSTETRICS & GYNECOLOGY

## 2022-09-13 NOTE — PROGRESS NOTES
Please call patient regarding results.  Passed 3 hour glucose tolerance test would; however, she needs to be mindful of carb intake as she may have pre gestational insulin resistance

## 2022-09-15 DIAGNOSIS — O99.213 OBESITY AFFECTING PREGNANCY IN THIRD TRIMESTER: ICD-10-CM

## 2022-09-15 DIAGNOSIS — O36.8990 UMBILICAL CORD CYST DURING PREGNANCY, ANTEPARTUM: Primary | ICD-10-CM

## 2022-09-19 ENCOUNTER — TELEPHONE (OUTPATIENT)
Dept: OBSTETRICS AND GYNECOLOGY | Facility: CLINIC | Age: 27
End: 2022-09-19
Payer: MEDICAID

## 2022-09-19 DIAGNOSIS — O21.9 NAUSEA AND VOMITING IN PREGNANCY: ICD-10-CM

## 2022-09-19 RX ORDER — ONDANSETRON 4 MG/1
4 TABLET, ORALLY DISINTEGRATING ORAL EVERY 8 HOURS PRN
Qty: 30 TABLET | Refills: 0 | Status: SHIPPED | OUTPATIENT
Start: 2022-09-19 | End: 2022-10-13 | Stop reason: SDUPTHER

## 2022-09-20 ENCOUNTER — PROCEDURE VISIT (OUTPATIENT)
Dept: MATERNAL FETAL MEDICINE | Facility: CLINIC | Age: 27
End: 2022-09-20
Payer: MEDICAID

## 2022-09-20 ENCOUNTER — OFFICE VISIT (OUTPATIENT)
Dept: MATERNAL FETAL MEDICINE | Facility: CLINIC | Age: 27
End: 2022-09-20
Payer: MEDICAID

## 2022-09-20 VITALS
SYSTOLIC BLOOD PRESSURE: 123 MMHG | DIASTOLIC BLOOD PRESSURE: 61 MMHG | HEART RATE: 91 BPM | WEIGHT: 221.13 LBS | HEIGHT: 63 IN | BODY MASS INDEX: 39.18 KG/M2

## 2022-09-20 DIAGNOSIS — O99.323 DRUG USE AFFECTING PREGNANCY IN THIRD TRIMESTER: ICD-10-CM

## 2022-09-20 DIAGNOSIS — O36.8990 UMBILICAL CORD CYST DURING PREGNANCY, ANTEPARTUM: Primary | ICD-10-CM

## 2022-09-20 DIAGNOSIS — O36.8990 UMBILICAL CORD CYST DURING PREGNANCY, ANTEPARTUM: ICD-10-CM

## 2022-09-20 DIAGNOSIS — O99.213 OBESITY AFFECTING PREGNANCY IN THIRD TRIMESTER: ICD-10-CM

## 2022-09-20 PROCEDURE — 99214 PR OFFICE/OUTPT VISIT, EST, LEVL IV, 30-39 MIN: ICD-10-PCS | Mod: TH,S$GLB,, | Performed by: OBSTETRICS & GYNECOLOGY

## 2022-09-20 PROCEDURE — 1160F PR REVIEW ALL MEDS BY PRESCRIBER/CLIN PHARMACIST DOCUMENTED: ICD-10-PCS | Mod: CPTII,S$GLB,, | Performed by: OBSTETRICS & GYNECOLOGY

## 2022-09-20 PROCEDURE — 3074F PR MOST RECENT SYSTOLIC BLOOD PRESSURE < 130 MM HG: ICD-10-PCS | Mod: CPTII,S$GLB,, | Performed by: OBSTETRICS & GYNECOLOGY

## 2022-09-20 PROCEDURE — 1159F PR MEDICATION LIST DOCUMENTED IN MEDICAL RECORD: ICD-10-PCS | Mod: CPTII,S$GLB,, | Performed by: OBSTETRICS & GYNECOLOGY

## 2022-09-20 PROCEDURE — 3078F DIAST BP <80 MM HG: CPT | Mod: CPTII,S$GLB,, | Performed by: OBSTETRICS & GYNECOLOGY

## 2022-09-20 PROCEDURE — 76819 FETAL BIOPHYS PROFIL W/O NST: CPT | Mod: S$GLB,,, | Performed by: OBSTETRICS & GYNECOLOGY

## 2022-09-20 PROCEDURE — 3074F SYST BP LT 130 MM HG: CPT | Mod: CPTII,S$GLB,, | Performed by: OBSTETRICS & GYNECOLOGY

## 2022-09-20 PROCEDURE — 1159F MED LIST DOCD IN RCRD: CPT | Mod: CPTII,S$GLB,, | Performed by: OBSTETRICS & GYNECOLOGY

## 2022-09-20 PROCEDURE — 76816 OB US FOLLOW-UP PER FETUS: CPT | Mod: S$GLB,,, | Performed by: OBSTETRICS & GYNECOLOGY

## 2022-09-20 PROCEDURE — 3008F PR BODY MASS INDEX (BMI) DOCUMENTED: ICD-10-PCS | Mod: CPTII,S$GLB,, | Performed by: OBSTETRICS & GYNECOLOGY

## 2022-09-20 PROCEDURE — 76816 US MFM PROCEDURE (VIEWPOINT): ICD-10-PCS | Mod: S$GLB,,, | Performed by: OBSTETRICS & GYNECOLOGY

## 2022-09-20 PROCEDURE — 3008F BODY MASS INDEX DOCD: CPT | Mod: CPTII,S$GLB,, | Performed by: OBSTETRICS & GYNECOLOGY

## 2022-09-20 PROCEDURE — 3078F PR MOST RECENT DIASTOLIC BLOOD PRESSURE < 80 MM HG: ICD-10-PCS | Mod: CPTII,S$GLB,, | Performed by: OBSTETRICS & GYNECOLOGY

## 2022-09-20 PROCEDURE — 99214 OFFICE O/P EST MOD 30 MIN: CPT | Mod: TH,S$GLB,, | Performed by: OBSTETRICS & GYNECOLOGY

## 2022-09-20 PROCEDURE — 76819 US MFM PROCEDURE (VIEWPOINT): ICD-10-PCS | Mod: S$GLB,,, | Performed by: OBSTETRICS & GYNECOLOGY

## 2022-09-20 PROCEDURE — 1160F RVW MEDS BY RX/DR IN RCRD: CPT | Mod: CPTII,S$GLB,, | Performed by: OBSTETRICS & GYNECOLOGY

## 2022-09-20 NOTE — ASSESSMENT & PLAN NOTE
There are no known benefits of marijuana use in pregnancy, and there is no known safe amount of cannabis in pregnancy. There are possible risks, however impact is unclear and sometimes conflicting due in part to confounding by other substance use. Women who smoke marijuana at least weekly are at higher risk of delivering a small for gestational age infant. Additionally, several studies note smaller birth lengths and head circumference in exposed children. Children exposed to marijuana in utero may have lower scores on reading and spelling tests and have been shown to be at higher risk for substance abuse and developmental abnormalities (especially in visuospatial function and decreased attention span/behavioral problems). Women should avoid marijuana use while breastfeeding. Patient was counseled regarding these risks and cessation was encouraged. She states she last used marijuana in May. She had a positive UDS in August.

## 2022-09-20 NOTE — ASSESSMENT & PLAN NOTE
A multiloculated umbilical cord cyst is noted near the fetal cord insertion site. Some areas of the cyst appear to have some heterogeneous echogenicity.  There is no apparent connection to an intra-abdominal fluid collection.  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  This could also represent myxoid degeneration of Joseluis's jelly within the cord.  There are no associated structural abnormalities and she has had low risk cell free DNA screening.  Given this, I counseled her that this is generally thought to be a benign finding, but some reports have found in association with growth restriction and other adverse pregnancy outcomes.  Most of the adverse outcome seen limited to cases in which there are associated anomalies.  I explained that there is no indication for any intervention or alteration in care at this point other than following fetal growth and reassessing the cyst periodically.    Recommendations:   -Serial growth ultrasounds every 4-6 weeks (scheduled through our office)

## 2022-09-20 NOTE — PROGRESS NOTES
"Maternal Fetal Medicine follow up consult    SUBJECTIVE:     Khris Sheikh is a 26 y.o.  female with IUP at 29w4d who is seen in follow up consultation by MFM.  Pregnancy complications include:   Problem   Drug Use Affecting Pregnancy in Third Trimester   Obesity Affecting Pregnancy in Third Trimester   Umbilical Cord Cyst During Pregnancy, Antepartum     She is feeling well and has no current complaints.No LOF, VB, ctx. +FM.     Previous notes reviewed.   No changes to medical, surgical, family, social, or obstetric history.    Interval history since last MFM visit: no changes    Medications:  Current Outpatient Medications   Medication Instructions    docusate sodium (COLACE) 100 mg, Oral, 2 times daily    ondansetron (ZOFRAN-ODT) 4 mg, Oral, Every 8 hours PRN    prenatal vit37/iron/folic acid (PRENATA ORAL) 1 tablet, Oral, Daily       Care team members:  Dr. Garnica- Primary OB       OBJECTIVE:   /61 (BP Location: Left arm)   Pulse 91   Ht 5' 3" (1.6 m)   Wt 100.3 kg (221 lb 1.9 oz)   LMP 2022   BMI 39.17 kg/m²     Ultrasound performed. See viewpoint for full ultrasound report.    A viable Jane pregnancy is visualized in the cephalic presentation.   Estimated fetal weight is at 37th percentile consistent with the gestational age.    No fetal abnormalities are detected.   Umbilical cord cyst is present proximal to the abdominal cord insertion measuring 3 cm in diameter ( Slightly larger than previous exam).   Umbilical artery Dopplers are elevated with forward flow.  Amniotic fluid volume is normal.   Placenta is anterior.  See full consult in EPIC.    ASSESSMENT/PLAN:     26 y.o.  female with IUP at 29w4d    Umbilical cord cyst during pregnancy, antepartum  A multiloculated umbilical cord cyst is noted near the fetal cord insertion site. Some areas of the cyst appear to have some heterogeneous echogenicity.  There is no apparent connection to an intra-abdominal fluid collection.  " Given the appearance of the cyst this appears most consistent with an allantoic cyst or omphalomesenteric duct cyst--both of which are embryologic remnants.  This could also represent myxoid degeneration of Joseluis's jelly within the cord.  There are no associated structural abnormalities and she has had low risk cell free DNA screening.  Given this, I counseled her that this is generally thought to be a benign finding, but some reports have found in association with growth restriction and other adverse pregnancy outcomes.  Most of the adverse outcome seen limited to cases in which there are associated anomalies.  I explained that there is no indication for any intervention or alteration in care at this point other than following fetal growth and reassessing the cyst periodically.  Given that the cyst has increased in size since her previous appointment, umbilical artery Dopplers were performed which demonstrated elevated S/D ratio.  This may indicate that the cyst is causing increased resistance to flow in the umbilical artery and therefore some mild compression.  I recommended following up to review this in 2 weeks.        Recommendations:   -Serial growth ultrasounds every 4-6 weeks (scheduled through our office)  -Umbilical artery Dopplers-repeat in 2 weeks      Obesity affecting pregnancy in third trimester  The patient was counseled on the  risks associated with obesity which include and increased risk of hypertension, preeclampsia, gestational diabetes, venous thromboembolic disease,  delivery, macrosomia, IUFD, longer first stage of labor, decreased TOLAC success rate, emergent & scheduled  & complications of .  Obesity is also associated with fetal anomalies, specifically neural tube defects.  Studies have shown that the rate of complication increases with rising BMI and thus pregnancies with maternal morbid obesity are at increased risk.      Recommendations:  TWG goal is  11-20 lbs  Screen for signs/symptoms of obstructive sleep apnea  Nutritionist consult offered (this is to be ordered by primary OB provider)  Start low dose aspirin 81 mg daily at 12-16 weeks for preeclampsia risk reduction  Targeted anatomical survey scheduled at 18-20 weeks  Fetal growth ultrasound at 32 weeks if BMI >= 40  Weekly  testing at 32 weeks if pre-pregnancy BMI >= 45  Lovenox 40 mg BID for VTE prophylaxis while admitted to the hospital (antepartum or postpartum) if BMI >= 40.   Encouraged breastfeeding  Postpartum lifestyle modifications & weight loss      Drug use affecting pregnancy in third trimester  There are no known benefits of marijuana use in pregnancy, and there is no known safe amount of cannabis in pregnancy. There are possible risks, however impact is unclear and sometimes conflicting due in part to confounding by other substance use. Women who smoke marijuana at least weekly are at higher risk of delivering a small for gestational age infant. Additionally, several studies note smaller birth lengths and head circumference in exposed children. Children exposed to marijuana in utero may have lower scores on reading and spelling tests and have been shown to be at higher risk for substance abuse and developmental abnormalities (especially in visuospatial function and decreased attention span/behavioral problems). Women should avoid marijuana use while breastfeeding. Patient was counseled regarding these risks and cessation was encouraged. She states she last used marijuana in May. She had a positive UDS in August.          F/u in 2 weeks for MFM visit/ultrasound    Dalia Pedro MD

## 2022-09-22 ENCOUNTER — ROUTINE PRENATAL (OUTPATIENT)
Dept: OBSTETRICS AND GYNECOLOGY | Facility: CLINIC | Age: 27
End: 2022-09-22
Payer: MEDICAID

## 2022-09-22 VITALS
DIASTOLIC BLOOD PRESSURE: 68 MMHG | WEIGHT: 223 LBS | SYSTOLIC BLOOD PRESSURE: 133 MMHG | HEART RATE: 87 BPM | BODY MASS INDEX: 39.5 KG/M2

## 2022-09-22 DIAGNOSIS — Z98.891 PREVIOUS CESAREAN SECTION: Primary | ICD-10-CM

## 2022-09-22 DIAGNOSIS — O36.8990 UMBILICAL CORD CYST DURING PREGNANCY, ANTEPARTUM: ICD-10-CM

## 2022-09-22 DIAGNOSIS — Z3A.29 29 WEEKS GESTATION OF PREGNANCY: ICD-10-CM

## 2022-09-22 PROCEDURE — 99213 PR OFFICE/OUTPT VISIT, EST, LEVL III, 20-29 MIN: ICD-10-PCS | Mod: TH,S$PBB,, | Performed by: OBSTETRICS & GYNECOLOGY

## 2022-09-22 PROCEDURE — 99999 PR PBB SHADOW E&M-EST. PATIENT-LVL II: ICD-10-PCS | Mod: PBBFAC,,, | Performed by: OBSTETRICS & GYNECOLOGY

## 2022-09-22 PROCEDURE — 99999 PR PBB SHADOW E&M-EST. PATIENT-LVL II: CPT | Mod: PBBFAC,,, | Performed by: OBSTETRICS & GYNECOLOGY

## 2022-09-22 PROCEDURE — 99212 OFFICE O/P EST SF 10 MIN: CPT | Mod: PBBFAC,TH | Performed by: OBSTETRICS & GYNECOLOGY

## 2022-09-22 PROCEDURE — 99213 OFFICE O/P EST LOW 20 MIN: CPT | Mod: TH,S$PBB,, | Performed by: OBSTETRICS & GYNECOLOGY

## 2022-09-22 NOTE — PROGRESS NOTES
Saw ROSE MARIE Tuesday and states that all is is doing well.  She reports that she needs to see them every 2 weeks.  We will coordinate our visits around there is.  Recommendations reviewed and will begin weekly NST at 32 weeks

## 2022-10-04 ENCOUNTER — PROCEDURE VISIT (OUTPATIENT)
Dept: MATERNAL FETAL MEDICINE | Facility: CLINIC | Age: 27
End: 2022-10-04
Payer: MEDICAID

## 2022-10-04 ENCOUNTER — OFFICE VISIT (OUTPATIENT)
Dept: MATERNAL FETAL MEDICINE | Facility: CLINIC | Age: 27
End: 2022-10-04
Payer: MEDICAID

## 2022-10-04 VITALS
DIASTOLIC BLOOD PRESSURE: 65 MMHG | WEIGHT: 223.13 LBS | HEIGHT: 63 IN | BODY MASS INDEX: 39.54 KG/M2 | HEART RATE: 95 BPM | SYSTOLIC BLOOD PRESSURE: 119 MMHG | OXYGEN SATURATION: 97 %

## 2022-10-04 DIAGNOSIS — O99.323 DRUG USE AFFECTING PREGNANCY IN THIRD TRIMESTER: ICD-10-CM

## 2022-10-04 DIAGNOSIS — O36.8990 UMBILICAL CORD CYST DURING PREGNANCY, ANTEPARTUM: ICD-10-CM

## 2022-10-04 DIAGNOSIS — O99.213 OBESITY AFFECTING PREGNANCY IN THIRD TRIMESTER: ICD-10-CM

## 2022-10-04 DIAGNOSIS — O33.7XX0 FETAL ASCITES CAUSING DISPROPORTION: ICD-10-CM

## 2022-10-04 PROCEDURE — 3008F PR BODY MASS INDEX (BMI) DOCUMENTED: ICD-10-PCS | Mod: CPTII,S$GLB,, | Performed by: OBSTETRICS & GYNECOLOGY

## 2022-10-04 PROCEDURE — 76820 US MFM PROCEDURE (VIEWPOINT): ICD-10-PCS | Mod: S$GLB,,, | Performed by: OBSTETRICS & GYNECOLOGY

## 2022-10-04 PROCEDURE — 1159F MED LIST DOCD IN RCRD: CPT | Mod: CPTII,S$GLB,, | Performed by: OBSTETRICS & GYNECOLOGY

## 2022-10-04 PROCEDURE — 76819 US MFM PROCEDURE (VIEWPOINT): ICD-10-PCS | Mod: S$GLB,,, | Performed by: OBSTETRICS & GYNECOLOGY

## 2022-10-04 PROCEDURE — 76819 FETAL BIOPHYS PROFIL W/O NST: CPT | Mod: S$GLB,,, | Performed by: OBSTETRICS & GYNECOLOGY

## 2022-10-04 PROCEDURE — 3008F BODY MASS INDEX DOCD: CPT | Mod: CPTII,S$GLB,, | Performed by: OBSTETRICS & GYNECOLOGY

## 2022-10-04 PROCEDURE — 1159F PR MEDICATION LIST DOCUMENTED IN MEDICAL RECORD: ICD-10-PCS | Mod: CPTII,S$GLB,, | Performed by: OBSTETRICS & GYNECOLOGY

## 2022-10-04 PROCEDURE — 3078F DIAST BP <80 MM HG: CPT | Mod: CPTII,S$GLB,, | Performed by: OBSTETRICS & GYNECOLOGY

## 2022-10-04 PROCEDURE — 3078F PR MOST RECENT DIASTOLIC BLOOD PRESSURE < 80 MM HG: ICD-10-PCS | Mod: CPTII,S$GLB,, | Performed by: OBSTETRICS & GYNECOLOGY

## 2022-10-04 PROCEDURE — 76815 OB US LIMITED FETUS(S): CPT | Mod: S$GLB,,, | Performed by: OBSTETRICS & GYNECOLOGY

## 2022-10-04 PROCEDURE — 1160F PR REVIEW ALL MEDS BY PRESCRIBER/CLIN PHARMACIST DOCUMENTED: ICD-10-PCS | Mod: CPTII,S$GLB,, | Performed by: OBSTETRICS & GYNECOLOGY

## 2022-10-04 PROCEDURE — 3074F SYST BP LT 130 MM HG: CPT | Mod: CPTII,S$GLB,, | Performed by: OBSTETRICS & GYNECOLOGY

## 2022-10-04 PROCEDURE — 76815 US MFM PROCEDURE (VIEWPOINT): ICD-10-PCS | Mod: S$GLB,,, | Performed by: OBSTETRICS & GYNECOLOGY

## 2022-10-04 PROCEDURE — 76820 UMBILICAL ARTERY ECHO: CPT | Mod: S$GLB,,, | Performed by: OBSTETRICS & GYNECOLOGY

## 2022-10-04 PROCEDURE — 99214 OFFICE O/P EST MOD 30 MIN: CPT | Mod: 25,TH,S$GLB, | Performed by: OBSTETRICS & GYNECOLOGY

## 2022-10-04 PROCEDURE — 99214 PR OFFICE/OUTPT VISIT, EST, LEVL IV, 30-39 MIN: ICD-10-PCS | Mod: 25,TH,S$GLB, | Performed by: OBSTETRICS & GYNECOLOGY

## 2022-10-04 PROCEDURE — 3074F PR MOST RECENT SYSTOLIC BLOOD PRESSURE < 130 MM HG: ICD-10-PCS | Mod: CPTII,S$GLB,, | Performed by: OBSTETRICS & GYNECOLOGY

## 2022-10-04 PROCEDURE — 1160F RVW MEDS BY RX/DR IN RCRD: CPT | Mod: CPTII,S$GLB,, | Performed by: OBSTETRICS & GYNECOLOGY

## 2022-10-04 NOTE — ASSESSMENT & PLAN NOTE
A multiloculated umbilical cord cyst is noted near the fetal cord insertion site. Some areas of the cyst appear to have some heterogeneous echogenicity.  There is no apparent connection to an intra-abdominal fluid collection.  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  This could also represent myxoid degeneration of Joseluis's jelly within the cord.  There are no associated structural abnormalities and she has had low risk cell free DNA screening.  Given this, I counseled her that this is generally thought to be a benign finding, but some reports have found in association with growth restriction and other adverse pregnancy outcomes.  Most of the adverse outcome seen limited to cases in which there are associated anomalies.  I explained that there is no indication for any intervention or alteration in care at this point other than following fetal growth and reassessing the cyst periodically.    Previously, there was an elevated S/D ratio with UAD.   10/4/22: Limited ultrasound today demonstrates a trace amount of fetal abdominal ascites. BPP 8/8. Repeat UAD today demonstrates diastolic flow with S/D ratio at ~85th percentile.     Recommendations:   - Weekly NST through primary OB's office.   - Repeat umbilical artery dopplers/limited ultrasound through [facility] office weekly (scheduled).  - Serial growth ultrasounds every 4 weeks (scheduled through our office).

## 2022-10-04 NOTE — ASSESSMENT & PLAN NOTE
Noted today, new finding  No other abnormal fluid compartments on ultrasound today, no hydrops diagnosed  Umbilical cord cyst may be the cause of fluid accumulation  Current amount is trace/mild  Warrants 2x weekly  testing, with NSTs at Dr. Garnica's office in addition to BPPs with us.  No additional workup recommended given presence of cyst and late gestation

## 2022-10-04 NOTE — PROGRESS NOTES
"Maternal Fetal Medicine follow up consult    SUBJECTIVE:     Khris Sheikh is a 26 y.o.  female with IUP at 31w4d who is seen in follow up consultation by MFM.  Pregnancy complications include:   Problem   Umbilical Cord Cyst During Pregnancy, Antepartum     She is feeling well and has no current complaints. No LOF, VB, ctx. +FM.   Previous notes reviewed.   No changes to medical, surgical, family, social, or obstetric history.    Interval history since last MFM visit: no changes    Medications:  Current Outpatient Medications   Medication Instructions    docusate sodium (COLACE) 100 mg, Oral, 2 times daily    ondansetron (ZOFRAN-ODT) 4 mg, Oral, Every 8 hours PRN    prenatal vit37/iron/folic acid (PRENATA ORAL) 1 tablet, Oral, Daily       Care team members:  Dr. Garnica- Primary OB       OBJECTIVE:   /65 (BP Location: Right arm)   Pulse 95   Ht 5' 3" (1.6 m)   Wt 101.2 kg (223 lb 1.7 oz)   LMP 2022   SpO2 97%   BMI 39.52 kg/m²     Ultrasound performed. See viewpoint for full ultrasound report.    A viable ho pregnancy is visualized in cephalic presentation.   Umbilical cord cyst is present proximal to the abdominal cord insertion measuring 4.2 x 2.9 x 3.3cm ( Slightly larger than previous exam).   Umbilical artery Dopplers are normal.  New trace fetal ascites is visualized today without hydrops. Amniotic fluid volume is normal.   Placenta is anterior.      ASSESSMENT/PLAN:     26 y.o.  female with IUP at 31w4d    Umbilical cord cyst during pregnancy, antepartum  A multiloculated umbilical cord cyst is noted near the fetal cord insertion site. Some areas of the cyst appear to have some heterogeneous echogenicity.  There is no apparent connection to an intra-abdominal fluid collection.  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  This could also represent myxoid " degeneration of Joseluis's jelly within the cord.  There are no associated structural abnormalities and she has had low risk cell free DNA screening.  Given this, I counseled her that this is generally thought to be a benign finding, but some reports have found in association with growth restriction and other adverse pregnancy outcomes.  Most of the adverse outcome seen limited to cases in which there are associated anomalies.  I explained that there is no indication for any intervention or alteration in care at this point other than following fetal growth and reassessing the cyst periodically.    Previously, there was an elevated S/D ratio with UAD.   10/4/22: Limited ultrasound today demonstrates a trace amount of fetal abdominal ascites. BPP . Repeat UAD today demonstrates diastolic flow with S/D ratio at ~85th percentile.     Recommendations:   - Weekly NST through primary OB's office.   - Repeat umbilical artery dopplers/limited ultrasound through [facility] office weekly (scheduled).  - Serial growth ultrasounds every 4 weeks (scheduled through our office).      Fetal ascites causing disproportion  Noted today, new finding  No other abnormal fluid compartments on ultrasound today, no hydrops diagnosed  Umbilical cord cyst may be the cause of fluid accumulation  Current amount is trace/mild  Warrants 2x weekly  testing, with NSTs at Dr. Garnica's office in addition to BPPs with us.  No additional workup recommended given presence of cyst and late gestation      F/u in 2 weeks for MFM visit/ultrasound    Dalia Pedro MD  Maternal Fetal Medicine

## 2022-10-10 DIAGNOSIS — Z36.2 ENCOUNTER FOR FOLLOW-UP ULTRASOUND OF FETAL ANATOMY: Primary | ICD-10-CM

## 2022-10-11 ENCOUNTER — OFFICE VISIT (OUTPATIENT)
Dept: MATERNAL FETAL MEDICINE | Facility: CLINIC | Age: 27
End: 2022-10-11
Payer: MEDICAID

## 2022-10-11 ENCOUNTER — PROCEDURE VISIT (OUTPATIENT)
Dept: MATERNAL FETAL MEDICINE | Facility: CLINIC | Age: 27
End: 2022-10-11
Payer: MEDICAID

## 2022-10-11 VITALS
DIASTOLIC BLOOD PRESSURE: 61 MMHG | HEART RATE: 95 BPM | SYSTOLIC BLOOD PRESSURE: 134 MMHG | BODY MASS INDEX: 39.98 KG/M2 | OXYGEN SATURATION: 99 % | HEIGHT: 63 IN | WEIGHT: 225.63 LBS

## 2022-10-11 DIAGNOSIS — O36.8990 UMBILICAL CORD CYST DURING PREGNANCY, ANTEPARTUM: Primary | ICD-10-CM

## 2022-10-11 DIAGNOSIS — Z36.2 ENCOUNTER FOR FOLLOW-UP ULTRASOUND OF FETAL ANATOMY: ICD-10-CM

## 2022-10-11 DIAGNOSIS — O36.8990 UMBILICAL CORD CYST DURING PREGNANCY, ANTEPARTUM: ICD-10-CM

## 2022-10-11 PROCEDURE — 3075F PR MOST RECENT SYSTOLIC BLOOD PRESS GE 130-139MM HG: ICD-10-PCS | Mod: CPTII,S$GLB,, | Performed by: OBSTETRICS & GYNECOLOGY

## 2022-10-11 PROCEDURE — 1159F PR MEDICATION LIST DOCUMENTED IN MEDICAL RECORD: ICD-10-PCS | Mod: CPTII,S$GLB,, | Performed by: OBSTETRICS & GYNECOLOGY

## 2022-10-11 PROCEDURE — 76816 US MFM PROCEDURE (VIEWPOINT): ICD-10-PCS | Mod: S$GLB,,, | Performed by: OBSTETRICS & GYNECOLOGY

## 2022-10-11 PROCEDURE — 3008F PR BODY MASS INDEX (BMI) DOCUMENTED: ICD-10-PCS | Mod: CPTII,S$GLB,, | Performed by: OBSTETRICS & GYNECOLOGY

## 2022-10-11 PROCEDURE — 76820 UMBILICAL ARTERY ECHO: CPT | Mod: S$GLB,,, | Performed by: OBSTETRICS & GYNECOLOGY

## 2022-10-11 PROCEDURE — 76819 US MFM PROCEDURE (VIEWPOINT): ICD-10-PCS | Mod: S$GLB,,, | Performed by: OBSTETRICS & GYNECOLOGY

## 2022-10-11 PROCEDURE — 3075F SYST BP GE 130 - 139MM HG: CPT | Mod: CPTII,S$GLB,, | Performed by: OBSTETRICS & GYNECOLOGY

## 2022-10-11 PROCEDURE — 3008F BODY MASS INDEX DOCD: CPT | Mod: CPTII,S$GLB,, | Performed by: OBSTETRICS & GYNECOLOGY

## 2022-10-11 PROCEDURE — 99213 PR OFFICE/OUTPT VISIT, EST, LEVL III, 20-29 MIN: ICD-10-PCS | Mod: 25,TH,S$GLB, | Performed by: OBSTETRICS & GYNECOLOGY

## 2022-10-11 PROCEDURE — 1160F PR REVIEW ALL MEDS BY PRESCRIBER/CLIN PHARMACIST DOCUMENTED: ICD-10-PCS | Mod: CPTII,S$GLB,, | Performed by: OBSTETRICS & GYNECOLOGY

## 2022-10-11 PROCEDURE — 1160F RVW MEDS BY RX/DR IN RCRD: CPT | Mod: CPTII,S$GLB,, | Performed by: OBSTETRICS & GYNECOLOGY

## 2022-10-11 PROCEDURE — 3078F DIAST BP <80 MM HG: CPT | Mod: CPTII,S$GLB,, | Performed by: OBSTETRICS & GYNECOLOGY

## 2022-10-11 PROCEDURE — 76819 FETAL BIOPHYS PROFIL W/O NST: CPT | Mod: S$GLB,,, | Performed by: OBSTETRICS & GYNECOLOGY

## 2022-10-11 PROCEDURE — 3078F PR MOST RECENT DIASTOLIC BLOOD PRESSURE < 80 MM HG: ICD-10-PCS | Mod: CPTII,S$GLB,, | Performed by: OBSTETRICS & GYNECOLOGY

## 2022-10-11 PROCEDURE — 1159F MED LIST DOCD IN RCRD: CPT | Mod: CPTII,S$GLB,, | Performed by: OBSTETRICS & GYNECOLOGY

## 2022-10-11 PROCEDURE — 76816 OB US FOLLOW-UP PER FETUS: CPT | Mod: S$GLB,,, | Performed by: OBSTETRICS & GYNECOLOGY

## 2022-10-11 PROCEDURE — 76820 US MFM PROCEDURE (VIEWPOINT): ICD-10-PCS | Mod: S$GLB,,, | Performed by: OBSTETRICS & GYNECOLOGY

## 2022-10-11 PROCEDURE — 99213 OFFICE O/P EST LOW 20 MIN: CPT | Mod: 25,TH,S$GLB, | Performed by: OBSTETRICS & GYNECOLOGY

## 2022-10-11 NOTE — ASSESSMENT & PLAN NOTE
A multiloculated umbilical cord cyst is noted near the fetal cord insertion site. Some areas of the cyst appear to have some heterogeneous echogenicity.  There is no apparent connection to an intra-abdominal fluid collection.  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  This could also represent myxoid degeneration of Joseluis's jelly within the cord.  There are no associated structural abnormalities and she has had low risk cell free DNA screening.  Given this, I counseled her that this is generally thought to be a benign finding, but some reports have found in association with growth restriction and other adverse pregnancy outcomes.  Most of the adverse outcome seen limited to cases in which there are associated anomalies.  I explained that there is no indication for any intervention or alteration in care at this point other than following fetal growth and reassessing the cyst periodically.    Previously, there was an elevated S/D ratio with UAD.   10/4/22: Limited ultrasound today demonstrates a trace amount of fetal abdominal ascites. BPP 8/8. Repeat UAD today demonstrates diastolic flow with S/D ratio at ~85th percentile.   10/11/22: Cyst dimensions ~3.54 x 3.47cm. EFW 24%, AC 38.8%. BPP 8/8. UAD diastolic flow with normal S/D ratio. Trace fetal abdominal ascites not appreciated on today's exam. Growth continues to be reassuring.    Recommendations:   - Weekly NST through primary OB's office.   - Repeat umbilical artery dopplers/limited ultrasound through [facility] office weekly (scheduled).  - Serial growth ultrasounds every 4 weeks (scheduled through our office).

## 2022-10-11 NOTE — PROGRESS NOTES
"Maternal Fetal Medicine follow up consult    SUBJECTIVE:     Khris Sheikh is a 26 y.o.  female with IUP at 32w4d who is seen in follow up consultation by MFM.  Pregnancy complications include:   Problem   Umbilical Cord Cyst During Pregnancy, Antepartum     She is feeling well and has no current complaints. No LOF, VB, ctx. +FM.    Previous notes reviewed.   No changes to medical, surgical, family, social, or obstetric history.    Interval history since last MFM visit: no changes    Medications:  Current Outpatient Medications   Medication Instructions    docusate sodium (COLACE) 100 mg, Oral, 2 times daily    ondansetron (ZOFRAN-ODT) 4 mg, Oral, Every 8 hours PRN    prenatal vit37/iron/folic acid (PRENATA ORAL) 1 tablet, Oral, Daily       Care team members:  Dr Garnica- Primary OB       OBJECTIVE:   /61 (BP Location: Right arm)   Pulse 95   Ht 5' 3" (1.6 m)   Wt 102.3 kg (225 lb 10.3 oz)   LMP 2022   SpO2 99%   BMI 39.97 kg/m²     Ultrasound performed. See viewpoint for full ultrasound report.    A viable ho pregnancy is visualized in cephalic presentation.   Estimated fetal weight is at 25th percentile (AC preserved) consistent with the gestational age.    No fetal abnormalities are detected and no ascites is appreciated.   Umbilical cord cyst is present proximal to the abdominal cord insertion measuring 2.65 x 3.33 cm in diameter (stable).   Umbilical artery Dopplers are normal with forward flow.  Amniotic fluid volume is normal.   Placenta is anterior.     ASSESSMENT/PLAN:     26 y.o.  female with IUP at 32w4d    Umbilical cord cyst during pregnancy, antepartum  A multiloculated umbilical cord cyst is noted near the fetal cord insertion site. Some areas of the cyst appear to have some heterogeneous echogenicity.  There is no apparent connection to an intra-abdominal fluid collection.  Given the appearance of the cyst this appears most consistent with an allantoic cyst or " omphalomesenteric duct cyst--both of which are embryologic remnants.  This could also represent myxoid degeneration of Joseluis's jelly within the cord.  There are no associated structural abnormalities and she has had low risk cell free DNA screening.  Given this, I counseled her that this is generally thought to be a benign finding, but some reports have found in association with growth restriction and other adverse pregnancy outcomes.  Most of the adverse outcome seen limited to cases in which there are associated anomalies.  I explained that there is no indication for any intervention or alteration in care at this point other than following fetal growth and reassessing the cyst periodically.    Previously, there was an elevated S/D ratio with UAD.   10/4/22: Limited ultrasound today demonstrates a trace amount of fetal abdominal ascites. BPP 8/8. Repeat UAD today demonstrates diastolic flow with S/D ratio at ~85th percentile.   10/11/22: Cyst dimensions ~3.54 x 3.47cm. EFW 24%, AC 38.8%. BPP 8/8. UAD diastolic flow with normal S/D ratio. Trace fetal abdominal ascites not appreciated on today's exam. Growth continues to be reassuring.    Recommendations:   - Weekly NST through primary OB's office.   - Repeat umbilical artery dopplers/limited ultrasound through [facility] office weekly (scheduled).  - Serial growth ultrasounds every 4 weeks (scheduled through our office).          F/u every Monday at [facility] for limited ultrasound/BPP/UAD    Dalia Pedro MD

## 2022-10-12 ENCOUNTER — HOSPITAL ENCOUNTER (OUTPATIENT)
Dept: OBSTETRICS AND GYNECOLOGY | Facility: HOSPITAL | Age: 27
Discharge: HOME | End: 2022-10-12
Attending: OBSTETRICS & GYNECOLOGY | Admitting: OBSTETRICS & GYNECOLOGY
Payer: MEDICAID

## 2022-10-12 VITALS
WEIGHT: 225.63 LBS | SYSTOLIC BLOOD PRESSURE: 119 MMHG | OXYGEN SATURATION: 100 % | HEART RATE: 88 BPM | TEMPERATURE: 98 F | HEIGHT: 63 IN | RESPIRATION RATE: 18 BRPM | BODY MASS INDEX: 39.98 KG/M2 | DIASTOLIC BLOOD PRESSURE: 56 MMHG

## 2022-10-12 PROCEDURE — 59025 FETAL NON-STRESS TEST: CPT

## 2022-10-12 NOTE — DISCHARGE INSTRUCTIONS
Keep previously scheduled clinic appointment  Return to L&D if you experience contractions every 2-5 minutes for two consecutive hours  Any vaginal Bleeding  Decreased fetal movement  Leaking of fluid  Headache, dizziness or blurred vision  Temperature 100.4 or greater  Call the clinic ([phone]) during the day time or L&D ([phone]) after hours for any questions/concerns.  Drink 8-10 glasses of water a day.

## 2022-10-12 NOTE — NURSING
Discharge instructions provided, all questions answered. Patient off unit for discharge via walkout in stable condition. Accompanied by staff x1

## 2022-10-13 ENCOUNTER — ROUTINE PRENATAL (OUTPATIENT)
Dept: OBSTETRICS AND GYNECOLOGY | Facility: CLINIC | Age: 27
End: 2022-10-13
Payer: MEDICAID

## 2022-10-13 VITALS
SYSTOLIC BLOOD PRESSURE: 130 MMHG | DIASTOLIC BLOOD PRESSURE: 75 MMHG | WEIGHT: 223 LBS | BODY MASS INDEX: 39.5 KG/M2 | HEART RATE: 89 BPM

## 2022-10-13 DIAGNOSIS — Z3A.32 32 WEEKS GESTATION OF PREGNANCY: Primary | ICD-10-CM

## 2022-10-13 DIAGNOSIS — Z30.09 UNWANTED FERTILITY: ICD-10-CM

## 2022-10-13 DIAGNOSIS — O36.8990 UMBILICAL CORD CYST DURING PREGNANCY, ANTEPARTUM: ICD-10-CM

## 2022-10-13 DIAGNOSIS — O21.9 NAUSEA AND VOMITING IN PREGNANCY: ICD-10-CM

## 2022-10-13 DIAGNOSIS — Z98.891 PREVIOUS CESAREAN SECTION: ICD-10-CM

## 2022-10-13 PROCEDURE — 99999 PR PBB SHADOW E&M-EST. PATIENT-LVL IV: ICD-10-PCS | Mod: PBBFAC,,, | Performed by: OBSTETRICS & GYNECOLOGY

## 2022-10-13 PROCEDURE — 99213 OFFICE O/P EST LOW 20 MIN: CPT | Mod: TH,S$PBB,, | Performed by: OBSTETRICS & GYNECOLOGY

## 2022-10-13 PROCEDURE — 99999 PR PBB SHADOW E&M-EST. PATIENT-LVL IV: CPT | Mod: PBBFAC,,, | Performed by: OBSTETRICS & GYNECOLOGY

## 2022-10-13 PROCEDURE — 99213 PR OFFICE/OUTPT VISIT, EST, LEVL III, 20-29 MIN: ICD-10-PCS | Mod: TH,S$PBB,, | Performed by: OBSTETRICS & GYNECOLOGY

## 2022-10-13 PROCEDURE — 99214 OFFICE O/P EST MOD 30 MIN: CPT | Mod: PBBFAC,TH | Performed by: OBSTETRICS & GYNECOLOGY

## 2022-10-13 RX ORDER — ONDANSETRON 4 MG/1
4 TABLET, ORALLY DISINTEGRATING ORAL EVERY 8 HOURS PRN
Qty: 30 TABLET | Refills: 0 | Status: SHIPPED | OUTPATIENT
Start: 2022-10-13 | End: 2022-10-18 | Stop reason: SDUPTHER

## 2022-10-13 NOTE — PROGRESS NOTES
Some continue nausea and requesting refill of Zofran.  Also complains of occasional contractions.  Has been seeing MFM weekly as well as weekly NSTs on Labor and delivery.  Fetal well-being has been reassuring.  Discussed timing of delivery and [facility] recommends delivery during 37th week.   with tubal ligation scheduled for 2022.  All questions answered and precautions given

## 2022-10-17 ENCOUNTER — OFFICE VISIT (OUTPATIENT)
Dept: MATERNAL FETAL MEDICINE | Facility: CLINIC | Age: 27
End: 2022-10-17
Payer: MEDICAID

## 2022-10-17 ENCOUNTER — PROCEDURE VISIT (OUTPATIENT)
Dept: MATERNAL FETAL MEDICINE | Facility: CLINIC | Age: 27
End: 2022-10-17
Payer: MEDICAID

## 2022-10-17 VITALS — OXYGEN SATURATION: 98 % | SYSTOLIC BLOOD PRESSURE: 135 MMHG | HEART RATE: 87 BPM | DIASTOLIC BLOOD PRESSURE: 66 MMHG

## 2022-10-17 DIAGNOSIS — O33.7XX0 FETAL ASCITES CAUSING DISPROPORTION: ICD-10-CM

## 2022-10-17 DIAGNOSIS — O36.8990 UMBILICAL CORD CYST DURING PREGNANCY, ANTEPARTUM: ICD-10-CM

## 2022-10-17 DIAGNOSIS — Z03.71 ENCOUNTER FOR SUSPECTED PROBLEM WITH AMNIOTIC CAVITY AND MEMBRANE RULED OUT: ICD-10-CM

## 2022-10-17 DIAGNOSIS — O36.8990 UMBILICAL CORD CYST DURING PREGNANCY, ANTEPARTUM: Primary | ICD-10-CM

## 2022-10-17 PROCEDURE — 99213 OFFICE O/P EST LOW 20 MIN: CPT | Mod: 25,TH,S$GLB, | Performed by: OBSTETRICS & GYNECOLOGY

## 2022-10-17 PROCEDURE — 1159F PR MEDICATION LIST DOCUMENTED IN MEDICAL RECORD: ICD-10-PCS | Mod: CPTII,S$GLB,, | Performed by: OBSTETRICS & GYNECOLOGY

## 2022-10-17 PROCEDURE — 99213 PR OFFICE/OUTPT VISIT, EST, LEVL III, 20-29 MIN: ICD-10-PCS | Mod: 25,TH,S$GLB, | Performed by: OBSTETRICS & GYNECOLOGY

## 2022-10-17 PROCEDURE — 76819 US MFM PROCEDURE (VIEWPOINT): ICD-10-PCS | Mod: S$GLB,,, | Performed by: OBSTETRICS & GYNECOLOGY

## 2022-10-17 PROCEDURE — 76820 US MFM PROCEDURE (VIEWPOINT): ICD-10-PCS | Mod: S$GLB,,, | Performed by: OBSTETRICS & GYNECOLOGY

## 2022-10-17 PROCEDURE — 76820 UMBILICAL ARTERY ECHO: CPT | Mod: S$GLB,,, | Performed by: OBSTETRICS & GYNECOLOGY

## 2022-10-17 PROCEDURE — 76819 FETAL BIOPHYS PROFIL W/O NST: CPT | Mod: S$GLB,,, | Performed by: OBSTETRICS & GYNECOLOGY

## 2022-10-17 PROCEDURE — 1160F RVW MEDS BY RX/DR IN RCRD: CPT | Mod: CPTII,S$GLB,, | Performed by: OBSTETRICS & GYNECOLOGY

## 2022-10-17 PROCEDURE — 1159F MED LIST DOCD IN RCRD: CPT | Mod: CPTII,S$GLB,, | Performed by: OBSTETRICS & GYNECOLOGY

## 2022-10-17 PROCEDURE — 1160F PR REVIEW ALL MEDS BY PRESCRIBER/CLIN PHARMACIST DOCUMENTED: ICD-10-PCS | Mod: CPTII,S$GLB,, | Performed by: OBSTETRICS & GYNECOLOGY

## 2022-10-17 NOTE — ASSESSMENT & PLAN NOTE
Noted on previous scan, appears to be resolved on last 2 scans.  No other abnormal fluid compartments on ultrasound today, no hydrops  Umbilical cord cyst may be the cause of previous fluid accumulation  Increased frequency of monitoring was previously recommended and she can return back to 1x weekly BPP in our office and one NST (no longer needs 2nd NST)  No additional workup recommended given presence of cyst and late gestation

## 2022-10-17 NOTE — ASSESSMENT & PLAN NOTE
A multiloculated umbilical cord cyst is noted near the fetal cord insertion site. Some areas of the cyst appear to have some heterogeneous echogenicity.  There is no apparent connection to an intra-abdominal fluid collection.  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  This could also represent myxoid degeneration of Joseluis's jelly within the cord.  There are no associated structural abnormalities and she has had low risk cell free DNA screening.  Given this, I counseled her that this is generally thought to be a benign finding, but some reports have found in association with growth restriction and other adverse pregnancy outcomes.  Most of the adverse outcome seen limited to cases in which there are associated anomalies.  I explained that there is no indication for any intervention or alteration in care at this point other than following fetal growth and reassessing the cyst periodically.    Previously, there was an elevated S/D ratio with UAD.   10/4/22: Limited ultrasound today demonstrates a trace amount of fetal abdominal ascites. BPP 8/8. Repeat UAD today demonstrates diastolic flow with S/D ratio at ~85th percentile.   10/11/22: Cyst dimensions ~3.54 x 3.47cm. EFW 24%, AC 38.8%. BPP 8/8. UAD diastolic flow with normal S/D ratio. Trace fetal abdominal ascites not appreciated on today's exam. Growth continues to be reassuring.  10/17: Cyst dimensions 4.23x 3.2 x 2.9 cm (slightly larger). Normal Dopplers and no fetal ascites seen today. BPP 8/8.  Recommendations:   - Weekly NST through primary OB's office.   - Repeat umbilical artery dopplers/limited ultrasound through [facility] office weekly (scheduled).  - Serial growth ultrasounds every 4 weeks (scheduled through our office).

## 2022-10-17 NOTE — ASSESSMENT & PLAN NOTE
Today patient reported leaking of fluid for the last several days, reports that her underwear is continuously wet  Took full history today and is inconsistent with leaking of fluid   Sterile speculum exam was completed today with negative pooling, negative ferning  Patient's membranes are not ruptured at this time and she was counseled about this finding

## 2022-10-17 NOTE — PROGRESS NOTES
Maternal Fetal Medicine follow up consult    SUBJECTIVE:     Khris Sheikh is a 26 y.o.  female with IUP at 33w3d who is seen in follow up consultation by MFM.  Pregnancy complications include:   Problem   Fetal Ascites Causing Disproportion     She is generally feeling well however she reports that for the last several days she has noted wetness in her underwear and some leaking of fluid.  She reports the fluid has a sweet odor to it and she is concerned that it could be amniotic fluid.  She does have some urination when she sneezes or laughs but says this is occurring randomly.  She has not had any large gushes, vaginal bleeding, contractions, or decreased fetal movement.      Previous notes reviewed.   No changes to medical, surgical, family, social, or obstetric history.    Interval history since last M visit: no changes    Medications:  Current Outpatient Medications   Medication Instructions    docusate sodium (COLACE) 100 mg, Oral, 2 times daily    ondansetron (ZOFRAN-ODT) 4 mg, Oral, Every 8 hours PRN    prenatal vit37/iron/folic acid (PRENATA ORAL) 1 tablet, Oral, Daily       Care team members:  Dr Garnica- Primary OB       OBJECTIVE:   LMP 2022     SSE: Neg pooling, neg ferning. Cervical mucus present with closed cervix.    Ultrasound performed. See viewpoint for full ultrasound report.    A viable ho pregnancy is visualized in cephalic presentation.     No fetal abnormalities are detected and no ascites is appreciated.   Umbilical cord cyst is present proximal to the abdominal cord insertion measuring 4.23x 3.2 x 2.9 cm (slightly larger).   Umbilical artery Dopplers are normal with forward flow.  Amniotic fluid volume is normal (18cm).   Placenta is anterior.  Biophysical profile is 8/8. She reports leaking of fluid today and was evaluated.       ASSESSMENT/PLAN:     26 y.o.  female with IUP at 33w3d    Fetal ascites causing disproportion  Noted on previous scan, appears to be  resolved on last 2 scans.  No other abnormal fluid compartments on ultrasound today, no hydrops  Umbilical cord cyst may be the cause of previous fluid accumulation  Increased frequency of monitoring was previously recommended and she can return back to 1x weekly BPP in our office and one NST (no longer needs 2nd NST)  No additional workup recommended given presence of cyst and late gestation    Encounter for suspected problem with amniotic cavity and membrane ruled out  Today patient reported leaking of fluid for the last several days, reports that her underwear is continuously wet  Took full history today and is inconsistent with leaking of fluid   Sterile speculum exam was completed today with negative pooling, negative ferning  Patient's membranes are not ruptured at this time and she was counseled about this finding    Umbilical cord cyst during pregnancy, antepartum  A multiloculated umbilical cord cyst is noted near the fetal cord insertion site. Some areas of the cyst appear to have some heterogeneous echogenicity.  There is no apparent connection to an intra-abdominal fluid collection.  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  This could also represent myxoid degeneration of Phillips's jelly within the cord.  There are no associated structural abnormalities and she has had low risk cell free DNA screening.  Given this, I counseled her that this is generally thought to be a benign finding, but some reports have found in association with growth restriction and other adverse pregnancy outcomes.  Most of the adverse outcome seen limited to cases in which there are associated anomalies.  I explained that there is no indication for any intervention or alteration in care at this point other than following fetal growth and reassessing the cyst periodically.    Previously, there was an elevated S/D  ratio with UAD.   10/4/22: Limited ultrasound today demonstrates a trace amount of fetal abdominal ascites. BPP 8/8. Repeat UAD today demonstrates diastolic flow with S/D ratio at ~85th percentile.   10/11/22: Cyst dimensions ~3.54 x 3.47cm. EFW 24%, AC 38.8%. BPP 8/8. UAD diastolic flow with normal S/D ratio. Trace fetal abdominal ascites not appreciated on today's exam. Growth continues to be reassuring.  10/17: Cyst dimensions 4.23x 3.2 x 2.9 cm (slightly larger). Normal Dopplers and no fetal ascites seen today. BPP 8/8.  Recommendations:   - Weekly NST through primary OB's office.   - Repeat umbilical artery dopplers/limited ultrasound through [facility] office weekly (scheduled).  - Serial growth ultrasounds every 4 weeks (scheduled through our office).        F/u every Monday at [facility] for limited ultrasound/BPP/UAD    Dalia Pedro MD

## 2022-10-18 ENCOUNTER — TELEPHONE (OUTPATIENT)
Dept: OBSTETRICS AND GYNECOLOGY | Facility: CLINIC | Age: 27
End: 2022-10-18
Payer: MEDICAID

## 2022-10-18 DIAGNOSIS — O21.9 NAUSEA AND VOMITING IN PREGNANCY: ICD-10-CM

## 2022-10-18 DIAGNOSIS — Z36.2 ENCOUNTER FOR FOLLOW-UP ULTRASOUND OF FETAL ANATOMY: Primary | ICD-10-CM

## 2022-10-18 RX ORDER — ONDANSETRON 4 MG/1
4 TABLET, ORALLY DISINTEGRATING ORAL EVERY 8 HOURS PRN
Qty: 30 TABLET | Refills: 0 | Status: SHIPPED | OUTPATIENT
Start: 2022-10-18 | End: 2024-02-28

## 2022-10-19 ENCOUNTER — HOSPITAL ENCOUNTER (OUTPATIENT)
Facility: HOSPITAL | Age: 27
Discharge: HOME | End: 2022-10-19
Attending: OBSTETRICS & GYNECOLOGY | Admitting: OBSTETRICS & GYNECOLOGY
Payer: MEDICAID

## 2022-10-19 ENCOUNTER — HOSPITAL ENCOUNTER (OUTPATIENT)
Dept: OBSTETRICS AND GYNECOLOGY | Facility: HOSPITAL | Age: 27
Discharge: HOME | End: 2022-10-19
Attending: OBSTETRICS & GYNECOLOGY
Payer: MEDICAID

## 2022-10-19 VITALS
TEMPERATURE: 98 F | HEIGHT: 63 IN | HEART RATE: 103 BPM | BODY MASS INDEX: 39.51 KG/M2 | RESPIRATION RATE: 15 BRPM | WEIGHT: 223 LBS | DIASTOLIC BLOOD PRESSURE: 58 MMHG | OXYGEN SATURATION: 98 % | SYSTOLIC BLOOD PRESSURE: 117 MMHG

## 2022-10-19 DIAGNOSIS — Z34.90 PREGNANT AND NOT YET DELIVERED: ICD-10-CM

## 2022-10-19 DIAGNOSIS — O36.8990 UMBILICAL CORD CYST DURING PREGNANCY, ANTEPARTUM: Primary | ICD-10-CM

## 2022-10-19 PROCEDURE — 99211 OFF/OP EST MAY X REQ PHY/QHP: CPT | Mod: 25,TH

## 2022-10-19 PROCEDURE — 59025 FETAL NON-STRESS TEST: CPT

## 2022-10-19 RX ORDER — SODIUM CHLORIDE, SODIUM LACTATE, POTASSIUM CHLORIDE, CALCIUM CHLORIDE 600; 310; 30; 20 MG/100ML; MG/100ML; MG/100ML; MG/100ML
INJECTION, SOLUTION INTRAVENOUS CONTINUOUS
Status: DISCONTINUED | OUTPATIENT
Start: 2022-10-19 | End: 2022-10-19 | Stop reason: HOSPADM

## 2022-10-19 NOTE — NURSING
Patient presents to OB triage for scheduled weekly NST.  Pt states no complaints at this time and reports good fetal movement.

## 2022-10-19 NOTE — DISCHARGE INSTRUCTIONS
Keep previously scheduled clinic appointment  Return to L&D if you experience contractions every 2-5 minutes for two consecutive hours  Any vaginal Bleeding  Decreased fetal movement  Leaking of fluid  Headache, dizziness or blurred vision  Temperature 100.4 or greater  Call the clinic ([phone]) during the day time or L&D [phone]) after hours for any questions/concerns.  Drink 8-10 glasses of water a day.

## 2022-10-19 NOTE — PROGRESS NOTES
Discharge instructions and follow up care reviewed with patient and written copy given.  Pt verbalized understanding.

## 2022-10-26 ENCOUNTER — PROCEDURE VISIT (OUTPATIENT)
Dept: MATERNAL FETAL MEDICINE | Facility: CLINIC | Age: 27
End: 2022-10-26
Payer: MEDICAID

## 2022-10-26 VITALS — SYSTOLIC BLOOD PRESSURE: 115 MMHG | DIASTOLIC BLOOD PRESSURE: 59 MMHG | HEART RATE: 89 BPM

## 2022-10-26 DIAGNOSIS — Z36.2 ENCOUNTER FOR FOLLOW-UP ULTRASOUND OF FETAL ANATOMY: ICD-10-CM

## 2022-10-26 PROCEDURE — 76819 US MFM PROCEDURE (VIEWPOINT): ICD-10-PCS | Mod: S$GLB,,, | Performed by: OBSTETRICS & GYNECOLOGY

## 2022-10-26 PROCEDURE — 76819 FETAL BIOPHYS PROFIL W/O NST: CPT | Mod: S$GLB,,, | Performed by: OBSTETRICS & GYNECOLOGY

## 2022-10-26 PROCEDURE — 76820 UMBILICAL ARTERY ECHO: CPT | Mod: S$GLB,,, | Performed by: OBSTETRICS & GYNECOLOGY

## 2022-10-26 PROCEDURE — 76820 US MFM PROCEDURE (VIEWPOINT): ICD-10-PCS | Mod: S$GLB,,, | Performed by: OBSTETRICS & GYNECOLOGY

## 2022-11-02 ENCOUNTER — HOSPITAL ENCOUNTER (OUTPATIENT)
Dept: OBSTETRICS AND GYNECOLOGY | Facility: HOSPITAL | Age: 27
Discharge: HOME | End: 2022-11-02
Attending: OBSTETRICS & GYNECOLOGY
Payer: MEDICAID

## 2022-11-02 ENCOUNTER — ROUTINE PRENATAL (OUTPATIENT)
Dept: OBSTETRICS AND GYNECOLOGY | Facility: CLINIC | Age: 27
End: 2022-11-02
Payer: MEDICAID

## 2022-11-02 ENCOUNTER — HOSPITAL ENCOUNTER (OUTPATIENT)
Facility: HOSPITAL | Age: 27
Discharge: HOME | End: 2022-11-02
Attending: OBSTETRICS & GYNECOLOGY | Admitting: OBSTETRICS & GYNECOLOGY
Payer: MEDICAID

## 2022-11-02 VITALS — SYSTOLIC BLOOD PRESSURE: 132 MMHG | BODY MASS INDEX: 40.39 KG/M2 | DIASTOLIC BLOOD PRESSURE: 72 MMHG | WEIGHT: 228 LBS

## 2022-11-02 VITALS — DIASTOLIC BLOOD PRESSURE: 58 MMHG | HEART RATE: 78 BPM | SYSTOLIC BLOOD PRESSURE: 107 MMHG

## 2022-11-02 DIAGNOSIS — Z36.85 ANTENATAL SCREENING FOR STREPTOCOCCUS B: ICD-10-CM

## 2022-11-02 DIAGNOSIS — O36.8990 UMBILICAL CORD CYST DURING PREGNANCY, ANTEPARTUM: ICD-10-CM

## 2022-11-02 DIAGNOSIS — Z36.89 NST (NON-STRESS TEST) REACTIVE: ICD-10-CM

## 2022-11-02 DIAGNOSIS — Z98.891 PREVIOUS CESAREAN SECTION: ICD-10-CM

## 2022-11-02 DIAGNOSIS — Z3A.35 35 WEEKS GESTATION OF PREGNANCY: ICD-10-CM

## 2022-11-02 DIAGNOSIS — Z36.89 SCREENING, ANTENATAL, FOR FETAL ANATOMIC SURVEY: Primary | ICD-10-CM

## 2022-11-02 LAB
AMPHET+METHAMPHET UR QL: NEGATIVE
BARBITURATES UR QL SCN>200 NG/ML: NEGATIVE
BENZODIAZ UR QL SCN>200 NG/ML: NEGATIVE
BZE UR QL SCN: NEGATIVE
CANNABINOIDS UR QL SCN: NEGATIVE
CREAT UR-MCNC: 129 MG/DL (ref 15–325)
METHADONE UR QL SCN>300 NG/ML: NEGATIVE
OPIATES UR QL SCN: NEGATIVE
PCP UR QL SCN>25 NG/ML: NEGATIVE
TOXICOLOGY INFORMATION: NORMAL

## 2022-11-02 PROCEDURE — 99213 OFFICE O/P EST LOW 20 MIN: CPT | Mod: TH,S$PBB,, | Performed by: OBSTETRICS & GYNECOLOGY

## 2022-11-02 PROCEDURE — 80307 DRUG TEST PRSMV CHEM ANLYZR: CPT | Performed by: OBSTETRICS & GYNECOLOGY

## 2022-11-02 PROCEDURE — 99213 PR OFFICE/OUTPT VISIT, EST, LEVL III, 20-29 MIN: ICD-10-PCS | Mod: TH,S$PBB,, | Performed by: OBSTETRICS & GYNECOLOGY

## 2022-11-02 PROCEDURE — 87081 CULTURE SCREEN ONLY: CPT | Performed by: OBSTETRICS & GYNECOLOGY

## 2022-11-02 PROCEDURE — 99999 PR PBB SHADOW E&M-EST. PATIENT-LVL II: ICD-10-PCS | Mod: PBBFAC,,, | Performed by: OBSTETRICS & GYNECOLOGY

## 2022-11-02 PROCEDURE — 99212 OFFICE O/P EST SF 10 MIN: CPT | Mod: PBBFAC,TH,25 | Performed by: OBSTETRICS & GYNECOLOGY

## 2022-11-02 PROCEDURE — 99999 PR PBB SHADOW E&M-EST. PATIENT-LVL II: CPT | Mod: PBBFAC,,, | Performed by: OBSTETRICS & GYNECOLOGY

## 2022-11-02 PROCEDURE — 99211 OFF/OP EST MAY X REQ PHY/QHP: CPT | Mod: 27,25

## 2022-11-02 PROCEDURE — 59025 FETAL NON-STRESS TEST: CPT

## 2022-11-02 PROCEDURE — 87661 TRICHOMONAS VAGINALIS AMPLIF: CPT | Performed by: OBSTETRICS & GYNECOLOGY

## 2022-11-02 NOTE — PROGRESS NOTES
GBS screen today.  Consents for surgery signed today.  Patient has decided against tubal ligation at this time.  Procedure explained including all risks, benefits, alternatives and she desires to proceed with .  All questions answered and consent obtained.  No other issues or complaints today.  Pain patient will report to labor and delivery after this visit for her NST.  Labor precautions given.  Return to clinic in 1 week

## 2022-11-02 NOTE — NURSING
Discharge instructions reviewed with patient.  Verbalizes understanding.  Discharged home ambulatory in stable condition

## 2022-11-02 NOTE — DISCHARGE INSTRUCTIONS
Return to L&D with regular contractions, rupture of membranes. Bright red vaginal bleeding, decreased fetal movement, headache not relieved with Tylenol or as needed.   Keep next scheduled appointment

## 2022-11-03 DIAGNOSIS — Z36.89 ENCOUNTER FOR ULTRASOUND TO CHECK FETAL GROWTH: Primary | ICD-10-CM

## 2022-11-05 LAB — BACTERIA SPEC AEROBE CULT: NORMAL

## 2022-11-07 ENCOUNTER — PROCEDURE VISIT (OUTPATIENT)
Dept: MATERNAL FETAL MEDICINE | Facility: CLINIC | Age: 27
End: 2022-11-07
Payer: MEDICAID

## 2022-11-07 ENCOUNTER — OFFICE VISIT (OUTPATIENT)
Dept: MATERNAL FETAL MEDICINE | Facility: CLINIC | Age: 27
End: 2022-11-07
Payer: MEDICAID

## 2022-11-07 VITALS
SYSTOLIC BLOOD PRESSURE: 128 MMHG | BODY MASS INDEX: 40.39 KG/M2 | HEART RATE: 102 BPM | OXYGEN SATURATION: 98 % | HEIGHT: 63 IN | DIASTOLIC BLOOD PRESSURE: 68 MMHG

## 2022-11-07 DIAGNOSIS — O99.213 OBESITY AFFECTING PREGNANCY IN THIRD TRIMESTER: ICD-10-CM

## 2022-11-07 DIAGNOSIS — Z36.89 ENCOUNTER FOR ULTRASOUND TO CHECK FETAL GROWTH: ICD-10-CM

## 2022-11-07 DIAGNOSIS — O36.8990 UMBILICAL CORD CYST DURING PREGNANCY, ANTEPARTUM: ICD-10-CM

## 2022-11-07 PROCEDURE — 3008F BODY MASS INDEX DOCD: CPT | Mod: CPTII,S$GLB,, | Performed by: OBSTETRICS & GYNECOLOGY

## 2022-11-07 PROCEDURE — 3078F DIAST BP <80 MM HG: CPT | Mod: CPTII,S$GLB,, | Performed by: OBSTETRICS & GYNECOLOGY

## 2022-11-07 PROCEDURE — 99213 OFFICE O/P EST LOW 20 MIN: CPT | Mod: 25,TH,S$GLB, | Performed by: OBSTETRICS & GYNECOLOGY

## 2022-11-07 PROCEDURE — 76816 OB US FOLLOW-UP PER FETUS: CPT | Mod: S$GLB,,, | Performed by: OBSTETRICS & GYNECOLOGY

## 2022-11-07 PROCEDURE — 76819 FETAL BIOPHYS PROFIL W/O NST: CPT | Mod: S$GLB,,, | Performed by: OBSTETRICS & GYNECOLOGY

## 2022-11-07 PROCEDURE — 76820 UMBILICAL ARTERY ECHO: CPT | Mod: S$GLB,,, | Performed by: OBSTETRICS & GYNECOLOGY

## 2022-11-07 PROCEDURE — 1160F PR REVIEW ALL MEDS BY PRESCRIBER/CLIN PHARMACIST DOCUMENTED: ICD-10-PCS | Mod: CPTII,S$GLB,, | Performed by: OBSTETRICS & GYNECOLOGY

## 2022-11-07 PROCEDURE — 3074F SYST BP LT 130 MM HG: CPT | Mod: CPTII,S$GLB,, | Performed by: OBSTETRICS & GYNECOLOGY

## 2022-11-07 PROCEDURE — 3008F PR BODY MASS INDEX (BMI) DOCUMENTED: ICD-10-PCS | Mod: CPTII,S$GLB,, | Performed by: OBSTETRICS & GYNECOLOGY

## 2022-11-07 PROCEDURE — 1159F MED LIST DOCD IN RCRD: CPT | Mod: CPTII,S$GLB,, | Performed by: OBSTETRICS & GYNECOLOGY

## 2022-11-07 PROCEDURE — 76820 US MFM PROCEDURE (VIEWPOINT): ICD-10-PCS | Mod: S$GLB,,, | Performed by: OBSTETRICS & GYNECOLOGY

## 2022-11-07 PROCEDURE — 76816 US MFM PROCEDURE (VIEWPOINT): ICD-10-PCS | Mod: S$GLB,,, | Performed by: OBSTETRICS & GYNECOLOGY

## 2022-11-07 PROCEDURE — 3078F PR MOST RECENT DIASTOLIC BLOOD PRESSURE < 80 MM HG: ICD-10-PCS | Mod: CPTII,S$GLB,, | Performed by: OBSTETRICS & GYNECOLOGY

## 2022-11-07 PROCEDURE — 1160F RVW MEDS BY RX/DR IN RCRD: CPT | Mod: CPTII,S$GLB,, | Performed by: OBSTETRICS & GYNECOLOGY

## 2022-11-07 PROCEDURE — 1159F PR MEDICATION LIST DOCUMENTED IN MEDICAL RECORD: ICD-10-PCS | Mod: CPTII,S$GLB,, | Performed by: OBSTETRICS & GYNECOLOGY

## 2022-11-07 PROCEDURE — 3074F PR MOST RECENT SYSTOLIC BLOOD PRESSURE < 130 MM HG: ICD-10-PCS | Mod: CPTII,S$GLB,, | Performed by: OBSTETRICS & GYNECOLOGY

## 2022-11-07 PROCEDURE — 76819 US MFM PROCEDURE (VIEWPOINT): ICD-10-PCS | Mod: S$GLB,,, | Performed by: OBSTETRICS & GYNECOLOGY

## 2022-11-07 PROCEDURE — 99213 PR OFFICE/OUTPT VISIT, EST, LEVL III, 20-29 MIN: ICD-10-PCS | Mod: 25,TH,S$GLB, | Performed by: OBSTETRICS & GYNECOLOGY

## 2022-11-07 NOTE — PROGRESS NOTES
"Maternal Fetal Medicine follow up consult    SUBJECTIVE:     Khris Sheikh is a 27 y.o.  female with IUP at 36w3d who is seen in follow up consultation by MFM.  Pregnancy complications include:   Problem   2. Obesity affecting pregnancy in third trimester   1. Umbilical cord cyst during pregnancy, antepartum     She is feeling well and has no current complaints. No LOF, VB, ctx. +FM.  Her  is scheduled for  and she is feeling prepared.    Previous notes reviewed.   No changes to medical, surgical, family, social, or obstetric history.    Interval history since last MFM visit: no changes    Medications:  Current Outpatient Medications   Medication Instructions    docusate sodium (COLACE) 100 mg, Oral, 2 times daily    ondansetron (ZOFRAN-ODT) 4 mg, Oral, Every 8 hours PRN    prenatal vit37/iron/folic acid (PRENATA ORAL) 1 tablet, Oral, Daily       Care team members:  Dr Garnica - Primary OB       OBJECTIVE:   /68 (BP Location: Right arm)   Pulse 102   Ht 5' 3" (1.6 m)   LMP 2022   SpO2 98%   BMI 40.39 kg/m²     Ultrasound performed. See viewpoint for full ultrasound report.    A viable ho pregnancy is visualized in cephalic presentation.   Estimated fetal weight is at 30th percentile (AC preserved) consistent with the gestational age.    No fetal abnormalities are detected and no ascites is appreciated.   Umbilical cord cyst is present proximal to the abdominal cord insertion measuring 1.53 x 2.41 cm in diameter (slightly smaller).   Umbilical artery Dopplers are normal with forward flow.  Amniotic fluid volume is normal.   Placenta is anterior.     ASSESSMENT/PLAN:     27 y.o.  female with IUP at 36w3d    1. Umbilical cord cyst during pregnancy, antepartum  A multiloculated umbilical cord cyst is noted near the fetal cord insertion site. Some areas of the cyst appear to have some heterogeneous echogenicity.  There is no apparent connection to an intra-abdominal fluid " collection.  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  This could also represent myxoid degeneration of Pamlico's jelly within the cord.  There are no associated structural abnormalities and she has had low risk cell free DNA screening.  Given this, I counseled her that this is generally thought to be a benign finding, but some reports have found in association with growth restriction and other adverse pregnancy outcomes.  Most of the adverse outcome seen limited to cases in which there are associated anomalies.  I explained that there is no indication for any intervention or alteration in care at this point other than following fetal growth and reassessing the cyst periodically.    Previously, there was an elevated S/D ratio with UAD.   10/4/22: Limited ultrasound today demonstrates a trace amount of fetal abdominal ascites. BPP 8/8. Repeat UAD today demonstrates diastolic flow with S/D ratio at ~85th percentile.   10/11/22: Cyst dimensions ~3.54 x 3.47cm. EFW 24%, AC 38.8%. BPP 8/8. UAD diastolic flow with normal S/D ratio. Trace fetal abdominal ascites not appreciated on today's exam. Growth continues to be reassuring.  10/17: Cyst dimensions 4.23x 3.2 x 2.9 cm (slightly larger). Normal Dopplers and no fetal ascites seen today. BPP 8/8.  22- stable in size. Fetal growth normal at 30th percentile. Normal umbilical artery dopplers and no fetal ascites. BPP 8/8 with normal AFV.    Recommendations:   - Weekly NST through primary OB's office.   - Ultrasound monitoring is complete unless new concerns arise.  - Delivery between 37-38 weeks (scheduled for  for repeat C/S)      2. Obesity affecting pregnancy in third trimester  The patient was counseled on the  risks associated with obesity which include and increased risk of hypertension, preeclampsia, gestational diabetes, venous  thromboembolic disease,  delivery, macrosomia, IUFD, longer first stage of labor, decreased TOLAC success rate, emergent & scheduled  & complications of .  Obesity is also associated with fetal anomalies, specifically neural tube defects.  Studies have shown that the rate of complication increases with rising BMI and thus pregnancies with maternal morbid obesity are at increased risk.      Recommendations:  TWG goal is 11-20 lbs  Screen for signs/symptoms of obstructive sleep apnea  Nutritionist consult offered (this is to be ordered by primary OB provider)  Start low dose aspirin 81 mg daily at 12-16 weeks for preeclampsia risk reduction  Targeted anatomical survey scheduled at 18-20 weeks  Fetal growth ultrasound at 32 weeks if BMI >= 40  Weekly  testing at 32 weeks if pre-pregnancy BMI >= 45  Lovenox 40 mg BID for VTE prophylaxis while admitted to the hospital (antepartum or postpartum) if BMI >= 40.   Encouraged breastfeeding  Postpartum lifestyle modifications & weight loss      She is scheduled for delivery on 22. Therefore, no further appts have been made.    Dalia Pedro MD  Maternal Fetal Medicine

## 2022-11-07 NOTE — ASSESSMENT & PLAN NOTE
A multiloculated umbilical cord cyst is noted near the fetal cord insertion site. Some areas of the cyst appear to have some heterogeneous echogenicity.  There is no apparent connection to an intra-abdominal fluid collection.  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  This could also represent myxoid degeneration of Joseluis's jelly within the cord.  There are no associated structural abnormalities and she has had low risk cell free DNA screening.  Given this, I counseled her that this is generally thought to be a benign finding, but some reports have found in association with growth restriction and other adverse pregnancy outcomes.  Most of the adverse outcome seen limited to cases in which there are associated anomalies.  I explained that there is no indication for any intervention or alteration in care at this point other than following fetal growth and reassessing the cyst periodically.    Previously, there was an elevated S/D ratio with UAD.   10/4/22: Limited ultrasound today demonstrates a trace amount of fetal abdominal ascites. BPP 8/8. Repeat UAD today demonstrates diastolic flow with S/D ratio at ~85th percentile.   10/11/22: Cyst dimensions ~3.54 x 3.47cm. EFW 24%, AC 38.8%. BPP 8/8. UAD diastolic flow with normal S/D ratio. Trace fetal abdominal ascites not appreciated on today's exam. Growth continues to be reassuring.  10/17: Cyst dimensions 4.23x 3.2 x 2.9 cm (slightly larger). Normal Dopplers and no fetal ascites seen today. BPP 8/8.  11/7/22- stable in size. Normal umbilical artery dopplers and no fetal ascites. BPP 8/8 with normal AFV.    Recommendations:   - Weekly NST through primary OB's office.   - Repeat umbilical artery dopplers/limited ultrasound through [facility] office weekly (scheduled).  - Serial growth ultrasounds every 4 weeks (scheduled through our office).

## 2022-11-08 LAB
CHLAMYDIA/N. GONORROHEAE AMP DNA: NORMAL
TRICHOMONAS VAGINALIS, DNA, URINE: NORMAL

## 2022-11-08 NOTE — DISCHARGE INSTRUCTIONS
BEFORE THE PROCEDURE:    REPORT ANY CHANGE IN YOUR PHYSICAL CONDITION TO YOUR DOCTOR IMMEDIATELY.  SELF ISOLATE AND CHECK TEMPERATURE DAILY, IF TEMP OVER 100, CALL PHYSICIAN IMMEDIATELY.  TRY TO REFRAIN FROM SMOKING AND ALCOHOL 72 HOURS BEFORE YOUR PROCEDURE.   DO NOT EAT OR DRINK ANYTHING AFTER MIDNIGHT THE NIGHT BEFORE YOUR PROCEDURE.  NO MAKE UP, NAIL POLISH OR JEWELRY.      SURGERY PREP INSTRUCTIONS    CHECK IN AT 5:00AM ON Monday November 14th AT REGISTRATION ON 1ST FLOOR.    DAY OF YOUR PROCEDURE:    TAKE BLOOD PRESSURE MEDICATIONS THE MORNING OF YOUR PROCEDURE, WITH SMALL SIPS WATER, AS DIRECTED BY YOUR PHYSICIAN.   DO NOT TAKE ANY DIABETIC MEDICATIONS UNLESS DIRECTED TO DO SO BY YOUR PHYSICIAN.   CONTACT LENSES AND DENTURES MUST BE REMOVED.  A RESPONSIBLE ADULT MUST ACCOMPANY YOU HOME UPON DISCHARGE.   ONLY 1 VISITOR ALLOWED PER ROOM.     YOUR THOUGHTS AND OPINIONS HELP US TO BETTER SERVE YOU.     PLEASE PARTICIPATE IN SURVEYS ABOUT YOUR CARE.    THANK YOU FOR CHOOSING [facility].

## 2022-11-09 ENCOUNTER — ROUTINE PRENATAL (OUTPATIENT)
Dept: OBSTETRICS AND GYNECOLOGY | Facility: CLINIC | Age: 27
End: 2022-11-09
Payer: MEDICAID

## 2022-11-09 ENCOUNTER — HOSPITAL ENCOUNTER (OUTPATIENT)
Dept: PREADMISSION TESTING | Facility: HOSPITAL | Age: 27
Discharge: HOME | End: 2022-11-09
Attending: OBSTETRICS & GYNECOLOGY
Payer: MEDICAID

## 2022-11-09 ENCOUNTER — HOSPITAL ENCOUNTER (OUTPATIENT)
Dept: OBSTETRICS AND GYNECOLOGY | Facility: HOSPITAL | Age: 27
Discharge: HOME | End: 2022-11-09
Attending: OBSTETRICS & GYNECOLOGY | Admitting: OBSTETRICS & GYNECOLOGY
Payer: MEDICAID

## 2022-11-09 VITALS
HEIGHT: 63 IN | WEIGHT: 232 LBS | SYSTOLIC BLOOD PRESSURE: 118 MMHG | RESPIRATION RATE: 18 BRPM | OXYGEN SATURATION: 98 % | BODY MASS INDEX: 41.11 KG/M2 | DIASTOLIC BLOOD PRESSURE: 69 MMHG | BODY MASS INDEX: 41.11 KG/M2 | WEIGHT: 232 LBS | TEMPERATURE: 98 F | HEIGHT: 63 IN | HEART RATE: 105 BPM

## 2022-11-09 VITALS
HEART RATE: 102 BPM | SYSTOLIC BLOOD PRESSURE: 132 MMHG | BODY MASS INDEX: 41.1 KG/M2 | DIASTOLIC BLOOD PRESSURE: 72 MMHG | WEIGHT: 232 LBS

## 2022-11-09 DIAGNOSIS — Z3A.36 36 WEEKS GESTATION OF PREGNANCY: Primary | ICD-10-CM

## 2022-11-09 DIAGNOSIS — Z98.891 PREVIOUS CESAREAN SECTION: ICD-10-CM

## 2022-11-09 DIAGNOSIS — O36.8990 UMBILICAL CORD CYST DURING PREGNANCY, ANTEPARTUM: Primary | ICD-10-CM

## 2022-11-09 DIAGNOSIS — O21.9 NAUSEA AND VOMITING IN PREGNANCY: ICD-10-CM

## 2022-11-09 DIAGNOSIS — O36.8990 UMBILICAL CORD CYST DURING PREGNANCY, ANTEPARTUM: ICD-10-CM

## 2022-11-09 DIAGNOSIS — Z36.89 NST (NON-STRESS TEST) REACTIVE: ICD-10-CM

## 2022-11-09 PROCEDURE — 99999 PR PBB SHADOW E&M-EST. PATIENT-LVL II: ICD-10-PCS | Mod: PBBFAC,,, | Performed by: OBSTETRICS & GYNECOLOGY

## 2022-11-09 PROCEDURE — 99212 OFFICE O/P EST SF 10 MIN: CPT | Mod: PBBFAC,TH | Performed by: OBSTETRICS & GYNECOLOGY

## 2022-11-09 PROCEDURE — 59025 FETAL NON-STRESS TEST: CPT

## 2022-11-09 PROCEDURE — 99999 PR PBB SHADOW E&M-EST. PATIENT-LVL II: CPT | Mod: PBBFAC,,, | Performed by: OBSTETRICS & GYNECOLOGY

## 2022-11-09 PROCEDURE — 99213 PR OFFICE/OUTPT VISIT, EST, LEVL III, 20-29 MIN: ICD-10-PCS | Mod: TH,S$PBB,, | Performed by: OBSTETRICS & GYNECOLOGY

## 2022-11-09 PROCEDURE — 99211 OFF/OP EST MAY X REQ PHY/QHP: CPT | Mod: 25,27

## 2022-11-09 PROCEDURE — 99213 OFFICE O/P EST LOW 20 MIN: CPT | Mod: TH,S$PBB,, | Performed by: OBSTETRICS & GYNECOLOGY

## 2022-11-09 RX ORDER — SODIUM CITRATE AND CITRIC ACID MONOHYDRATE 334; 500 MG/5ML; MG/5ML
30 SOLUTION ORAL
Status: CANCELLED | OUTPATIENT
Start: 2022-11-09

## 2022-11-09 RX ORDER — OXYTOCIN/RINGER'S LACTATE 30/500 ML
334 PLASTIC BAG, INJECTION (ML) INTRAVENOUS ONCE
Status: CANCELLED | OUTPATIENT
Start: 2022-11-09 | End: 2022-11-09

## 2022-11-09 RX ORDER — CARBOPROST TROMETHAMINE 250 UG/ML
250 INJECTION, SOLUTION INTRAMUSCULAR
Status: CANCELLED | OUTPATIENT
Start: 2022-11-09

## 2022-11-09 RX ORDER — SODIUM CHLORIDE, SODIUM LACTATE, POTASSIUM CHLORIDE, CALCIUM CHLORIDE 600; 310; 30; 20 MG/100ML; MG/100ML; MG/100ML; MG/100ML
INJECTION, SOLUTION INTRAVENOUS CONTINUOUS
Status: CANCELLED | OUTPATIENT
Start: 2022-11-09

## 2022-11-09 RX ORDER — FAMOTIDINE 10 MG/ML
20 INJECTION INTRAVENOUS
Status: CANCELLED | OUTPATIENT
Start: 2022-11-09

## 2022-11-09 RX ORDER — OXYTOCIN/RINGER'S LACTATE 30/500 ML
95 PLASTIC BAG, INJECTION (ML) INTRAVENOUS ONCE
Status: CANCELLED | OUTPATIENT
Start: 2022-11-09 | End: 2022-11-09

## 2022-11-09 RX ORDER — METHYLERGONOVINE MALEATE 0.2 MG/ML
200 INJECTION INTRAVENOUS
Status: CANCELLED | OUTPATIENT
Start: 2022-11-09

## 2022-11-09 RX ORDER — MUPIROCIN 20 MG/G
OINTMENT TOPICAL
Status: CANCELLED | OUTPATIENT
Start: 2022-11-09

## 2022-11-09 RX ORDER — MISOPROSTOL 100 UG/1
800 TABLET ORAL
Status: CANCELLED | OUTPATIENT
Start: 2022-11-09

## 2022-11-09 NOTE — DISCHARGE INSTRUCTIONS
Keep previously scheduled clinic appointment  Return to L&D if you experience contractions every 2-5 minutes for two consecutive hours  Any vaginal Bleeding  Decreased fetal movement  Leaking of fluid  Headache, dizziness or blurred vision  Temperature 100.4 or greater  Call the clinic [phone]) during the day time or L&D [phone]) after hours for any questions/concerns.  Drink 8-10 glasses of water a day.

## 2022-11-09 NOTE — PROGRESS NOTES
Patient feeling well.  No complaints today.  She is just ready for delivery.   scheduled for Monday.  No tubal as she has changed her mind.  All questions answered and precautions given.  To Labor and delivery for NST.

## 2022-11-09 NOTE — PRE-PROCEDURE INSTRUCTIONS
"PT WAS SEEING DR TY AUSTIN IN [place] "A SPECIALIST" DUE TO BABY HAVING A "LUMP" ON THE UMBILICAL CORD. WAS RELEASED FROM HER ON 11/7/22. PT ADMITS THE "LUMP" HAS DECREASED IN SIZE.  "

## 2022-11-10 ENCOUNTER — ANESTHESIA EVENT (OUTPATIENT)
Dept: SURGERY | Facility: HOSPITAL | Age: 27
End: 2022-11-10
Payer: MEDICAID

## 2022-11-10 NOTE — ANESTHESIA PREPROCEDURE EVALUATION
11/10/2022  Khris Sheikh is a 27 y.o., female.      Pre-op Assessment    I have reviewed the Patient Summary Reports.    I have reviewed the NPO Status.   I have reviewed the Medications.     Review of Systems  Anesthesia Hx:  No problems with previous Anesthesia  Denies Family Hx of Anesthesia complications.   Denies Personal Hx of Anesthesia complications.   Social:  Non-Smoker    Cardiovascular:  Cardiovascular Normal     Pulmonary:  Pulmonary Normal    Renal/:  Renal/ Normal     Hepatic/GI:  Hepatic/GI Normal    Neurological:  Neurology Normal    Endocrine:  Endocrine Normal      Lab Results   Component Value Date    WBC 12.93 (H) 11/14/2022    HGB 11.5 (L) 11/14/2022    HCT 35.6 (L) 11/14/2022    MCV 81 (L) 11/14/2022     11/14/2022       Lab Results   Component Value Date    WBC 12.64 11/09/2022    HGB 11.5 (L) 11/09/2022    HCT 34.8 (L) 11/09/2022    MCV 82 11/09/2022     11/09/2022     CMP  Sodium   Date Value Ref Range Status   11/09/2022 136 136 - 145 mmol/L Final     Potassium   Date Value Ref Range Status   11/09/2022 3.6 3.5 - 5.1 mmol/L Final     Chloride   Date Value Ref Range Status   11/09/2022 103 95 - 110 mmol/L Final     CO2   Date Value Ref Range Status   11/09/2022 22 (L) 23 - 29 mmol/L Final     Glucose   Date Value Ref Range Status   11/09/2022 82 70 - 110 mg/dL Final     BUN   Date Value Ref Range Status   11/09/2022 8 6 - 20 mg/dL Final     Creatinine   Date Value Ref Range Status   11/09/2022 0.7 0.5 - 1.4 mg/dL Final     Calcium   Date Value Ref Range Status   11/09/2022 8.3 (L) 8.7 - 10.5 mg/dL Final     Total Protein   Date Value Ref Range Status   11/09/2022 6.8 6.0 - 8.4 g/dL Final     Albumin   Date Value Ref Range Status   11/09/2022 2.5 (L) 3.5 - 5.2 g/dL Final     Total Bilirubin   Date Value Ref Range Status   11/09/2022 0.3 0.1 - 1.0 mg/dL Final      Comment:     For infants and newborns, interpretation of results should be based  on gestational age, weight and in agreement with clinical  observations.    Premature Infant recommended reference ranges:  Up to 24 hours.............<8.0 mg/dL  Up to 48 hours............<12.0 mg/dL  3-5 days..................<15.0 mg/dL  6-29 days.................<15.0 mg/dL    For patients on Eltrombopag therapy, use of Dimension [place] TBIL is   not   recommended.       Alkaline Phosphatase   Date Value Ref Range Status   11/09/2022 147 (H) 55 - 135 U/L Final     AST   Date Value Ref Range Status   11/09/2022 11 10 - 40 U/L Final     ALT   Date Value Ref Range Status   11/09/2022 13 10 - 44 U/L Final     Anion Gap   Date Value Ref Range Status   11/09/2022 11 8 - 16 mmol/L Final     eGFR   Date Value Ref Range Status   11/09/2022 >60.0 >60 mL/min/1.73 m^2 Final       Physical Exam  General: Well nourished    Airway:  Mallampati: II / II  Mouth Opening: Normal  TM Distance: Normal  Tongue: Normal  Neck ROM: Normal ROM    Chest/Lungs:  Clear to auscultation    Heart:  Rate: Normal  Rhythm: Regular Rhythm  Sounds: Normal        Anesthesia Plan  Type of Anesthesia, risks & benefits discussed:    Anesthesia Type: Spinal, Gen ETT  Intra-op Monitoring Plan: Standard ASA Monitors  Post Op Pain Control Plan: multimodal analgesia  Induction:  IV  Airway Plan: Direct  Informed Consent: Informed consent signed with the Patient and all parties understand the risks and agree with anesthesia plan.  All questions answered.   ASA Score: 2  Day of Surgery Review of History & Physical: I have interviewed and examined the patient. I have reviewed the patient's H&P dated: There are no significant changes.     Ready For Surgery From Anesthesia Perspective.     .

## 2022-11-14 ENCOUNTER — ANESTHESIA (OUTPATIENT)
Dept: SURGERY | Facility: HOSPITAL | Age: 27
End: 2022-11-14
Payer: MEDICAID

## 2022-11-14 ENCOUNTER — HOSPITAL ENCOUNTER (INPATIENT)
Facility: HOSPITAL | Age: 27
LOS: 2 days | Discharge: HOME | End: 2022-11-16
Attending: OBSTETRICS & GYNECOLOGY | Admitting: OBSTETRICS & GYNECOLOGY
Payer: MEDICAID

## 2022-11-14 DIAGNOSIS — O36.8990 UMBILICAL CORD CYST DURING PREGNANCY, ANTEPARTUM: ICD-10-CM

## 2022-11-14 DIAGNOSIS — Z98.891 PREVIOUS CESAREAN SECTION: ICD-10-CM

## 2022-11-14 PROBLEM — O34.219 PREVIOUS CESAREAN DELIVERY AFFECTING PREGNANCY: Status: ACTIVE | Noted: 2020-07-27

## 2022-11-14 LAB
ABO + RH BLD: NORMAL
AMPHET+METHAMPHET UR QL: NEGATIVE
BARBITURATES UR QL SCN>200 NG/ML: NEGATIVE
BASOPHILS # BLD AUTO: 0.05 K/UL (ref 0–0.2)
BASOPHILS NFR BLD: 0.4 % (ref 0–1.9)
BENZODIAZ UR QL SCN>200 NG/ML: NEGATIVE
BLD GP AB SCN CELLS X3 SERPL QL: NORMAL
BZE UR QL SCN: NEGATIVE
CANNABINOIDS UR QL SCN: NEGATIVE
CREAT UR-MCNC: 114 MG/DL (ref 15–325)
DIFFERENTIAL METHOD: ABNORMAL
EOSINOPHIL # BLD AUTO: 0.1 K/UL (ref 0–0.5)
EOSINOPHIL NFR BLD: 0.7 % (ref 0–8)
ERYTHROCYTE [DISTWIDTH] IN BLOOD BY AUTOMATED COUNT: 12.7 % (ref 11.5–14.5)
HCT VFR BLD AUTO: 35.6 % (ref 37–48.5)
HGB BLD-MCNC: 11.5 G/DL (ref 12–16)
IMM GRANULOCYTES # BLD AUTO: 0.1 K/UL (ref 0–0.04)
IMM GRANULOCYTES NFR BLD AUTO: 0.8 % (ref 0–0.5)
LYMPHOCYTES # BLD AUTO: 2.6 K/UL (ref 1–4.8)
LYMPHOCYTES NFR BLD: 20.4 % (ref 18–48)
MCH RBC QN AUTO: 26.2 PG (ref 27–31)
MCHC RBC AUTO-ENTMCNC: 32.3 G/DL (ref 32–36)
MCV RBC AUTO: 81 FL (ref 82–98)
METHADONE UR QL SCN>300 NG/ML: NEGATIVE
MONOCYTES # BLD AUTO: 1 K/UL (ref 0.3–1)
MONOCYTES NFR BLD: 7.4 % (ref 4–15)
NEUTROPHILS # BLD AUTO: 9.1 K/UL (ref 1.8–7.7)
NEUTROPHILS NFR BLD: 70.3 % (ref 38–73)
NRBC BLD-RTO: 0 /100 WBC
OPIATES UR QL SCN: NEGATIVE
PCP UR QL SCN>25 NG/ML: NEGATIVE
PLATELET # BLD AUTO: 255 K/UL (ref 150–450)
PMV BLD AUTO: 10.5 FL (ref 9.2–12.9)
RBC # BLD AUTO: 4.39 M/UL (ref 4–5.4)
TOXICOLOGY INFORMATION: NORMAL
WBC # BLD AUTO: 12.93 K/UL (ref 3.9–12.7)

## 2022-11-14 PROCEDURE — 36000709 HC OR TIME LEV III EA ADD 15 MIN: Performed by: OBSTETRICS & GYNECOLOGY

## 2022-11-14 PROCEDURE — 25000003 PHARM REV CODE 250: Performed by: OBSTETRICS & GYNECOLOGY

## 2022-11-14 PROCEDURE — 59514 CESAREAN DELIVERY ONLY: CPT | Mod: AT,,, | Performed by: OBSTETRICS & GYNECOLOGY

## 2022-11-14 PROCEDURE — 99223 1ST HOSP IP/OBS HIGH 75: CPT | Mod: ,,, | Performed by: OBSTETRICS & GYNECOLOGY

## 2022-11-14 PROCEDURE — 59025 FETAL NON-STRESS TEST: CPT

## 2022-11-14 PROCEDURE — 25000003 PHARM REV CODE 250: Performed by: ANESTHESIOLOGY

## 2022-11-14 PROCEDURE — 59514 PR CESAREAN DELIVERY ONLY: ICD-10-PCS | Mod: AT,,, | Performed by: OBSTETRICS & GYNECOLOGY

## 2022-11-14 PROCEDURE — 63600175 PHARM REV CODE 636 W HCPCS: Performed by: OBSTETRICS & GYNECOLOGY

## 2022-11-14 PROCEDURE — 85025 COMPLETE CBC W/AUTO DIFF WBC: CPT | Performed by: OBSTETRICS & GYNECOLOGY

## 2022-11-14 PROCEDURE — 86901 BLOOD TYPING SEROLOGIC RH(D): CPT | Performed by: OBSTETRICS & GYNECOLOGY

## 2022-11-14 PROCEDURE — 80307 DRUG TEST PRSMV CHEM ANLYZR: CPT | Performed by: OBSTETRICS & GYNECOLOGY

## 2022-11-14 PROCEDURE — 11200000 HC OBSETRICS PRIVATE ROOM

## 2022-11-14 PROCEDURE — 71000033 HC RECOVERY, INTIAL HOUR: Performed by: OBSTETRICS & GYNECOLOGY

## 2022-11-14 PROCEDURE — 37000008 HC ANESTHESIA 1ST 15 MINUTES: Performed by: OBSTETRICS & GYNECOLOGY

## 2022-11-14 PROCEDURE — 27201423 OPTIME MED/SURG SUP & DEVICES STERILE SUPPLY: Performed by: OBSTETRICS & GYNECOLOGY

## 2022-11-14 PROCEDURE — 63600175 PHARM REV CODE 636 W HCPCS: Performed by: NURSE ANESTHETIST, CERTIFIED REGISTERED

## 2022-11-14 PROCEDURE — 36000708 HC OR TIME LEV III 1ST 15 MIN: Performed by: OBSTETRICS & GYNECOLOGY

## 2022-11-14 PROCEDURE — 25000003 PHARM REV CODE 250: Performed by: NURSE ANESTHETIST, CERTIFIED REGISTERED

## 2022-11-14 PROCEDURE — 37000009 HC ANESTHESIA EA ADD 15 MINS: Performed by: OBSTETRICS & GYNECOLOGY

## 2022-11-14 PROCEDURE — 99223 PR INITIAL HOSPITAL CARE,LEVL III: ICD-10-PCS | Mod: ,,, | Performed by: OBSTETRICS & GYNECOLOGY

## 2022-11-14 RX ORDER — SIMETHICONE 80 MG
1 TABLET,CHEWABLE ORAL EVERY 6 HOURS PRN
Status: DISCONTINUED | OUTPATIENT
Start: 2022-11-14 | End: 2022-11-16 | Stop reason: HOSPADM

## 2022-11-14 RX ORDER — SODIUM CITRATE AND CITRIC ACID MONOHYDRATE 334; 500 MG/5ML; MG/5ML
30 SOLUTION ORAL
Status: DISCONTINUED | OUTPATIENT
Start: 2022-11-14 | End: 2022-11-14

## 2022-11-14 RX ORDER — ADHESIVE BANDAGE
30 BANDAGE TOPICAL 2 TIMES DAILY PRN
Status: DISCONTINUED | OUTPATIENT
Start: 2022-11-15 | End: 2022-11-16 | Stop reason: HOSPADM

## 2022-11-14 RX ORDER — OXYTOCIN/RINGER'S LACTATE 30/500 ML
95 PLASTIC BAG, INJECTION (ML) INTRAVENOUS ONCE
Status: DISCONTINUED | OUTPATIENT
Start: 2022-11-14 | End: 2022-11-16 | Stop reason: HOSPADM

## 2022-11-14 RX ORDER — IBUPROFEN 600 MG/1
600 TABLET ORAL EVERY 6 HOURS
Status: DISCONTINUED | OUTPATIENT
Start: 2022-11-14 | End: 2022-11-16 | Stop reason: HOSPADM

## 2022-11-14 RX ORDER — DOCUSATE SODIUM 100 MG/1
200 CAPSULE, LIQUID FILLED ORAL 2 TIMES DAILY
Status: DISCONTINUED | OUTPATIENT
Start: 2022-11-14 | End: 2022-11-16 | Stop reason: HOSPADM

## 2022-11-14 RX ORDER — MORPHINE SULFATE 4 MG/ML
4 INJECTION, SOLUTION INTRAMUSCULAR; INTRAVENOUS EVERY 5 MIN PRN
Status: DISCONTINUED | OUTPATIENT
Start: 2022-11-14 | End: 2022-11-14

## 2022-11-14 RX ORDER — PROCHLORPERAZINE EDISYLATE 5 MG/ML
5 INJECTION INTRAMUSCULAR; INTRAVENOUS EVERY 6 HOURS PRN
Status: DISCONTINUED | OUTPATIENT
Start: 2022-11-14 | End: 2022-11-16 | Stop reason: HOSPADM

## 2022-11-14 RX ORDER — OXYCODONE AND ACETAMINOPHEN 10; 325 MG/1; MG/1
1 TABLET ORAL EVERY 4 HOURS PRN
Status: DISCONTINUED | OUTPATIENT
Start: 2022-11-14 | End: 2022-11-16 | Stop reason: HOSPADM

## 2022-11-14 RX ORDER — ONDANSETRON 2 MG/ML
4 INJECTION INTRAMUSCULAR; INTRAVENOUS DAILY PRN
Status: DISCONTINUED | OUTPATIENT
Start: 2022-11-14 | End: 2022-11-14

## 2022-11-14 RX ORDER — PRENATAL WITH FERROUS FUM AND FOLIC ACID 3080; 920; 120; 400; 22; 1.84; 3; 20; 10; 1; 12; 200; 27; 25; 2 [IU]/1; [IU]/1; MG/1; [IU]/1; MG/1; MG/1; MG/1; MG/1; MG/1; MG/1; UG/1; MG/1; MG/1; MG/1; MG/1
1 TABLET ORAL DAILY
Status: DISCONTINUED | OUTPATIENT
Start: 2022-11-14 | End: 2022-11-16 | Stop reason: HOSPADM

## 2022-11-14 RX ORDER — BISACODYL 10 MG
10 SUPPOSITORY, RECTAL RECTAL ONCE AS NEEDED
Status: DISCONTINUED | OUTPATIENT
Start: 2022-11-14 | End: 2022-11-16 | Stop reason: HOSPADM

## 2022-11-14 RX ORDER — MUPIROCIN 20 MG/G
OINTMENT TOPICAL
Status: DISCONTINUED | OUTPATIENT
Start: 2022-11-14 | End: 2022-11-14

## 2022-11-14 RX ORDER — SODIUM CHLORIDE 0.9 % (FLUSH) 0.9 %
10 SYRINGE (ML) INJECTION
Status: DISCONTINUED | OUTPATIENT
Start: 2022-11-14 | End: 2022-11-16 | Stop reason: HOSPADM

## 2022-11-14 RX ORDER — FENTANYL CITRATE 50 UG/ML
INJECTION, SOLUTION INTRAMUSCULAR; INTRAVENOUS
Status: DISCONTINUED | OUTPATIENT
Start: 2022-11-14 | End: 2022-11-14

## 2022-11-14 RX ORDER — MUPIROCIN 20 MG/G
OINTMENT TOPICAL 2 TIMES DAILY
Status: DISCONTINUED | OUTPATIENT
Start: 2022-11-14 | End: 2022-11-16 | Stop reason: HOSPADM

## 2022-11-14 RX ORDER — BUPIVACAINE HYDROCHLORIDE 7.5 MG/ML
INJECTION, SOLUTION EPIDURAL; RETROBULBAR
Status: COMPLETED | OUTPATIENT
Start: 2022-11-14 | End: 2022-11-14

## 2022-11-14 RX ORDER — DIPHENHYDRAMINE HCL 25 MG
25 CAPSULE ORAL EVERY 4 HOURS PRN
Status: DISCONTINUED | OUTPATIENT
Start: 2022-11-14 | End: 2022-11-16 | Stop reason: HOSPADM

## 2022-11-14 RX ORDER — METHYLERGONOVINE MALEATE 0.2 MG/ML
200 INJECTION INTRAVENOUS
Status: DISCONTINUED | OUTPATIENT
Start: 2022-11-14 | End: 2022-11-14

## 2022-11-14 RX ORDER — SODIUM CHLORIDE, SODIUM LACTATE, POTASSIUM CHLORIDE, CALCIUM CHLORIDE 600; 310; 30; 20 MG/100ML; MG/100ML; MG/100ML; MG/100ML
INJECTION, SOLUTION INTRAVENOUS CONTINUOUS
Status: DISCONTINUED | OUTPATIENT
Start: 2022-11-14 | End: 2022-11-14

## 2022-11-14 RX ORDER — EPHEDRINE SULFATE 50 MG/ML
INJECTION, SOLUTION INTRAVENOUS
Status: DISCONTINUED | OUTPATIENT
Start: 2022-11-14 | End: 2022-11-14

## 2022-11-14 RX ORDER — FAMOTIDINE 10 MG/ML
20 INJECTION INTRAVENOUS
Status: DISCONTINUED | OUTPATIENT
Start: 2022-11-14 | End: 2022-11-14

## 2022-11-14 RX ORDER — ONDANSETRON 4 MG/1
8 TABLET, ORALLY DISINTEGRATING ORAL EVERY 8 HOURS PRN
Status: DISCONTINUED | OUTPATIENT
Start: 2022-11-14 | End: 2022-11-16 | Stop reason: HOSPADM

## 2022-11-14 RX ORDER — HYDROCORTISONE 25 MG/G
CREAM TOPICAL 3 TIMES DAILY PRN
Status: DISCONTINUED | OUTPATIENT
Start: 2022-11-14 | End: 2022-11-16 | Stop reason: HOSPADM

## 2022-11-14 RX ORDER — HYDROMORPHONE HYDROCHLORIDE 1 MG/ML
1 INJECTION, SOLUTION INTRAMUSCULAR; INTRAVENOUS; SUBCUTANEOUS EVERY 4 HOURS PRN
Status: DISCONTINUED | OUTPATIENT
Start: 2022-11-14 | End: 2022-11-16 | Stop reason: HOSPADM

## 2022-11-14 RX ORDER — OXYCODONE AND ACETAMINOPHEN 5; 325 MG/1; MG/1
1 TABLET ORAL EVERY 4 HOURS PRN
Status: DISCONTINUED | OUTPATIENT
Start: 2022-11-14 | End: 2022-11-16 | Stop reason: HOSPADM

## 2022-11-14 RX ORDER — DIPHENHYDRAMINE HYDROCHLORIDE 50 MG/ML
25 INJECTION INTRAMUSCULAR; INTRAVENOUS EVERY 6 HOURS PRN
Status: DISCONTINUED | OUTPATIENT
Start: 2022-11-14 | End: 2022-11-14

## 2022-11-14 RX ORDER — SODIUM CHLORIDE 9 MG/ML
INJECTION, SOLUTION INTRAVENOUS CONTINUOUS
Status: DISCONTINUED | OUTPATIENT
Start: 2022-11-14 | End: 2022-11-14

## 2022-11-14 RX ORDER — HYDROMORPHONE HYDROCHLORIDE 1 MG/ML
0.5 INJECTION, SOLUTION INTRAMUSCULAR; INTRAVENOUS; SUBCUTANEOUS EVERY 5 MIN PRN
Status: DISCONTINUED | OUTPATIENT
Start: 2022-11-14 | End: 2022-11-14

## 2022-11-14 RX ORDER — CEFAZOLIN SODIUM 2 G/50ML
2 SOLUTION INTRAVENOUS
Status: COMPLETED | OUTPATIENT
Start: 2022-11-14 | End: 2022-11-14

## 2022-11-14 RX ORDER — AMOXICILLIN 250 MG
1 CAPSULE ORAL NIGHTLY PRN
Status: DISCONTINUED | OUTPATIENT
Start: 2022-11-14 | End: 2022-11-16 | Stop reason: HOSPADM

## 2022-11-14 RX ORDER — MISOPROSTOL 200 UG/1
800 TABLET ORAL
Status: DISCONTINUED | OUTPATIENT
Start: 2022-11-14 | End: 2022-11-14

## 2022-11-14 RX ORDER — CARBOPROST TROMETHAMINE 250 UG/ML
250 INJECTION, SOLUTION INTRAMUSCULAR
Status: DISCONTINUED | OUTPATIENT
Start: 2022-11-14 | End: 2022-11-14

## 2022-11-14 RX ORDER — OXYTOCIN/RINGER'S LACTATE 30/500 ML
334 PLASTIC BAG, INJECTION (ML) INTRAVENOUS ONCE
Status: COMPLETED | OUTPATIENT
Start: 2022-11-14 | End: 2022-11-14

## 2022-11-14 RX ORDER — OXYTOCIN/RINGER'S LACTATE 30/500 ML
95 PLASTIC BAG, INJECTION (ML) INTRAVENOUS ONCE
Status: DISCONTINUED | OUTPATIENT
Start: 2022-11-14 | End: 2022-11-14

## 2022-11-14 RX ADMIN — OXYCODONE AND ACETAMINOPHEN 1 TABLET: 10; 325 TABLET ORAL at 12:11

## 2022-11-14 RX ADMIN — MUPIROCIN: 20 OINTMENT TOPICAL at 06:11

## 2022-11-14 RX ADMIN — FAMOTIDINE 20 MG: 10 INJECTION INTRAVENOUS at 06:11

## 2022-11-14 RX ADMIN — SODIUM CHLORIDE: 0.9 INJECTION, SOLUTION INTRAVENOUS at 08:11

## 2022-11-14 RX ADMIN — IBUPROFEN 600 MG: 600 TABLET ORAL at 06:11

## 2022-11-14 RX ADMIN — SODIUM CHLORIDE, SODIUM LACTATE, POTASSIUM CHLORIDE, AND CALCIUM CHLORIDE: 600; 310; 30; 20 INJECTION, SOLUTION INTRAVENOUS at 08:11

## 2022-11-14 RX ADMIN — CEFAZOLIN SODIUM 2 G: 2 SOLUTION INTRAVENOUS at 07:11

## 2022-11-14 RX ADMIN — DOCUSATE SODIUM 200 MG: 100 CAPSULE, LIQUID FILLED ORAL at 08:11

## 2022-11-14 RX ADMIN — FENTANYL CITRATE 20 MCG: 50 INJECTION INTRAMUSCULAR; INTRAVENOUS at 07:11

## 2022-11-14 RX ADMIN — DOCUSATE SODIUM 200 MG: 100 CAPSULE, LIQUID FILLED ORAL at 09:11

## 2022-11-14 RX ADMIN — BUPIVACAINE HYDROCHLORIDE 1.4 ML: 7.5 INJECTION, SOLUTION EPIDURAL; RETROBULBAR at 07:11

## 2022-11-14 RX ADMIN — MUPIROCIN: 20 OINTMENT TOPICAL at 08:11

## 2022-11-14 RX ADMIN — PRENATAL VIT W/ FE FUMARATE-FA TAB 27-0.8 MG 1 TABLET: 27-0.8 TAB at 10:11

## 2022-11-14 RX ADMIN — SODIUM CHLORIDE, SODIUM LACTATE, POTASSIUM CHLORIDE, AND CALCIUM CHLORIDE: 600; 310; 30; 20 INJECTION, SOLUTION INTRAVENOUS at 06:11

## 2022-11-14 RX ADMIN — EPHEDRINE SULFATE 25 MG: 50 INJECTION, SOLUTION INTRAVENOUS at 08:11

## 2022-11-14 RX ADMIN — IBUPROFEN 600 MG: 600 TABLET ORAL at 11:11

## 2022-11-14 RX ADMIN — SODIUM CITRATE AND CITRIC ACID MONOHYDRATE 30 ML: 500; 334 SOLUTION ORAL at 06:11

## 2022-11-14 RX ADMIN — Medication 337 ML/HR: at 08:11

## 2022-11-14 NOTE — H&P
History and Physical  Obstetrics      SUBJECTIVE:     Khris Sheikh is a 27 y.o.  female  at 37w3d weeks gestation with an Estimated Date of Delivery: 22 who is admitted for repeat  Section. She has been followed prenatally with the following prenatal complications: prior , fetal umbilical hernia. She denies Decreased Fetal Movement, Leakage of Fluid, and Vaginal Bleeding. Fetal Movement: normal. It has been recommended by MFM to deliver during the 37th week.    Active Hospital Problems    Diagnosis    *Previous  delivery affecting pregnancy    2. Obesity affecting pregnancy in third trimester    1. Umbilical cord cyst during pregnancy, antepartum         HISTORY:     Prior to Admission medications    Medication Sig Start Date End Date Taking? Authorizing Provider   docusate sodium (COLACE) 100 MG capsule Take 1 capsule (100 mg total) by mouth 2 (two) times daily. 5/10/22   Cindy Garnica MD   ondansetron (ZOFRAN-ODT) 4 MG TbDL Take 1 tablet (4 mg total) by mouth every 8 (eight) hours as needed (nausea). 10/18/22   Cindy Garnica MD   prenatal vit37/iron/folic acid (PRENATA ORAL) Take 1 tablet by mouth once daily.    Historical Provider     Past Medical History:   Diagnosis Date    Gestational diabetes     WITH LAST PREGNANCY    Nausea      Past Surgical History:   Procedure Laterality Date     SECTION       SECTION N/A 2020    Procedure:  SECTION;  Surgeon: Cindy Garnica MD;  Location: [facility];  Service: OB/GYN;  Laterality: N/A;     Family History   Problem Relation Age of Onset    Heart failure Mother     Diabetes Mother     Diabetes Father     Lung disease Father      Social History     Tobacco Use    Smoking status: Never    Smokeless tobacco: Never   Substance Use Topics    Alcohol use: Not Currently    Drug use: Yes     Types: Marijuana     Comment: last use 2022     OB History    Para Term  AB Living   3 2 2 0 0 2   SAB  IAB Ectopic Multiple Live Births   0 0 0 0 2      # Outcome Date GA Lbr Reinaldo/2nd Weight Sex Delivery Anes PTL Lv   3 Current            2 Term 07/27/20 39w0d  3.487 kg (7 lb 11 oz) M CS-LTranv Spinal  PRIYA   1 Term 04/22/14 39w0d  3.033 kg (6 lb 11 oz) M CS-LTranv EPI N PRIYA       Allergy:   Review of patient's allergies indicates:  No Known Allergies       Review of Systems   Constitutional:  Negative for chills, fatigue and fever.   Respiratory:  Negative for shortness of breath.    Cardiovascular:  Negative for chest pain.   Gastrointestinal:  Negative for abdominal pain, constipation, diarrhea and nausea.   Genitourinary:  Negative for bladder incontinence, dysuria, hot flashes, pelvic pain and vaginal bleeding.   Neurological:  Negative for headaches.   Psychiatric/Behavioral:  Negative for depression.        OBJECTIVE:     Initial Vitals [11/14/22 0548]   BP Pulse Resp Temp SpO2   128/86 93 18 97.8 °F (36.6 °C) 99 %      MAP       --             Physical Exam:  General:  alert,normal appearing gravid female   Skin:  Skin color, texture, turgor normal. No rashes or lesions   HEENT:  conjunctivae/corneas clear. JOSÉ MIGUEL   Lungs:  clear to auscultation bilaterally   Heart:  regular rate and rhythm, S1, S2 normal, no murmur, click, rub or gallop   Abdomen:  soft, non-tender; bowel sounds normal   Uterine Size:  consistent with dates   Presentations:  cephalic   FHT:  135 BPM   Pelvis: External genitalia: normal external genitalia without lesions  Vaginal: without lesions or discharge   Cervix: deferred    Dilation:     Effacement:     Station:      Consistency:     Position:        OB Lab History:     Group & Rh   Date Value Ref Range Status   11/14/2022 AB POS  Final     Indirect Uma   Date Value Ref Range Status   11/14/2022 NEG  Final     Lab Results   Component Value Date    WBC 12.93 (H) 11/14/2022    HGB 11.5 (L) 11/14/2022    HCT 35.6 (L) 11/14/2022    MCV 81 (L) 11/14/2022     11/14/2022         Lab  Results   Component Value Date    TSH 1.130 05/10/2022     Lab Results   Component Value Date    RUBELLAIGGAN 22.4 05/10/2022     Lab Results   Component Value Date    RPR Non-reactive 2022     HIV 1/2 Ag/Ab   Date Value Ref Range Status   2022 Non-reactive Non-reactive Final     Hepatitis B Surface Ag   Date Value Ref Range Status   05/10/2022 Negative Negative Final     Results for orders placed or performed in visit on 22   OB Glucose Screen   Result Value Ref Range    OB Glucose Screen 148 (H) 70 - 140 mg/dL     Results for orders placed or performed in visit on 22   Strep B Screen, Vaginal / Rectal    Specimen: Vaginal/Rectal   Result Value Ref Range    Strep B Culture No Group B Streptococcus isolated      No results found for this or any previous visit.  No results found for this or any previous visit.    ASSESSMENT/PLAN:     IUP 37w3d gestation  Umbilical hernia vs umbilical cord cyst    PLAN:   Repeat  Section          Patient cleared for Anesthesia:  Spinal    Anesthesia/Surgery risks, benefits, and alternative options discussed and understood by patient/family.      Cindy Garnica MD FACOG    2022  7:37 AM

## 2022-11-14 NOTE — PLAN OF CARE
[place] - Labor And Delivery  Discharge Assessment    Primary Care Provider: Cindy Garnica MD     OB Screen (most recent)       OB Screen - 22 1014          OB SCREEN    Assessment Type Discharge Planning Assessment     Source of Information facility verbal report;health record     Received Prenatal Care Yes     Any indications/suspicions for Other (Comments)   presumptive positive marijuana July    Is this a teen pregnancy No     Is the baby in NICU No     Indication for adoption/Safe Haven No     Indication for DME/post-acute needs No     HIV (+) No     Any congenital  disorders No     Fetal demise/ death No                     Patient with presumptive + marijuana July.  Spoke with nurses, confirmed they are collecting urine and stool on baby.

## 2022-11-14 NOTE — L&D DELIVERY NOTE
[facility] - Labor And Delivery   Section   Operative Note    SUMMARY     Date of Procedure: 2022     Procedure: Procedure(s) (LRB):   SECTION (N/A)    Surgeon(s) and Role:     * Cindy Garnica MD - Primary    Assisting Surgeon: None    Pre-Operative Diagnosis: Previous  section [Z98.891]  Umbilical cord cyst during pregnancy, antepartum [O36.8990]      Post-Operative Diagnosis: Post-Op Diagnosis Codes:     * Previous  section [Z98.891]     * Umbilical cord cyst during pregnancy, antepartum [O36.8990]        Anesthesia: Spinal    Technical Procedures Used: repeat low-transverse  section           Description of the Findings of the Procedure: viable male infant in vertex presentation. Enlarged area at umbilical cord insertion consistent with cyst or hernia. Normal uterus, tubes. ovaries    Complications: No    Blood Loss:  cc, see below for QBL.  UOP: 100 cc  IV fluids: 2 liters     With patient in supine position, the legs are  and Duarte Catheter placed and positioning to supine done.   Abdomen prepped with Chloroprep and 3 minute drying time allowed prior to draping of the abdomen.   Time out taken with OR team members.  Pfannenstiel Incision made through the skin, transverse fascial incision developed, rectus muscles  in the midline and the peritoneum entered.   no adhesions noted.  The lower uterine segment and position of the fetus identified.   Bladder flap taken down through transverse peritoneal incision.    Low Transverse Incision made through well developer lower uterine segment and extended laterally with blunt dissection.   Clear fluid noted.  Infant delivered from vertex presentation.  Cord clamped after one minute and  handed to attending nurse.  Cord blood taken, placenta delivered.  The uterus was exteriorized.  The edges of the uterine incision are grasped with ring forceps at the angles and the inferior and superior midline  "edges of the incision.    Closure with running lock 0 monocryl, starting at the left angle and tying at the right.  Observation for bleeding with suture of any bleeding along the hysterotomy line.   With good hemostasis noted, the anterior pelvis is rinsed with sterile saline.   Right and left adnexa with normal anatomy.     Closure of the abdomen with 0 chromic running of the peritoneum and rectus, fascial closure with 0 vicryl starting at the left angle and tying the knot at the right angle.  Skin closure with 3-0 monocryl subcuticular.  Wound dressed with benzoin, steri strips, silver dressing.          Specimens:   Specimen (24h ago, onward)      None            Condition: Stable    Disposition: PACU - hemodynamically stable.    Attestation: Good         Delivery Information for Lawrence Sheikh    Birth information:  YOB: 2021   Time of birth: 8:13 AM   Sex: male   Head Delivery Date/Time: 2022  8:13 AM   Delivery type: , Low Transverse   Gestational Age: 37w3d    Delivery Providers    Delivering clinician: Cindy Garnica MD   Provider Role    Jackie Larson LPN Licensed Practical Nurse    Vance Santana, ST Eleazar Alexander RN               Measurements    Weight: 2707 g  Weight (lbs): 5 lb 15.5 oz  Length: 45.7 cm  Length (in): 18"  Head circumference: 34.3 cm  Chest circumference: 30.5 cm  Abdominal girth: 29.2 cm         Apgars    Living status: Living  Apgars:  1 min.:  5 min.:  10 min.:  15 min.:  20 min.:    Skin color:  2  2       Heart rate:  2  2       Reflex irritability:  2  2       Muscle tone:  1  1       Respiratory effort:  2  2       Total:  9  9                Operative Delivery    Forceps attempted?: No  Vacuum extractor attempted?: Yes  Vacuum type: Kiwi Omni Cup (mushroom)  Number of pop offs: 0  Number of pulls with vacuum: 1  Vacuum applied by: PRICE  Failed vacuum delivery?: No         Shoulder Dystocia    Shoulder dystocia present?: " No           Presentation    Presentation: Vertex  Position: Left Occiput Transverse           Interventions/Resuscitation    Method: Bulb Suctioning, Tactile Stimulation       Cord    Vessels: 3 vessels  Complications: None  Delayed Cord Clamping?: Yes  Cord Clamped Date/Time: 2022  8:14 AM  Cord Blood Disposition: Discarded  Gases Sent?: No  Stem Cell Collection (by MD): No       Placenta    Placenta delivery date/time: 2022 0815  Placenta removal: Manual removal  Placenta appearance: Intact  Placenta disposition: discarded           Labor Events:       labor: No     Labor Onset Date/Time:         Dilation Complete Date/Time:         Start Pushing Date/Time:       Rupture Date/Time:            Rupture type: INT (Intact)           Fluid Amount:         Fluid Color:          steroids: None     Antibiotics given for GBS: No     Induction: none     Indications for induction:        Augmentation:       Indications for augmentation:       Labor complications: None     Additional complications:          Cervical ripening:                     Delivery:      Episiotomy:       Indication for Episiotomy:       Perineal Lacerations:   Repaired:      Periurethral Laceration:   Repaired:     Labial Laceration:   Repaired:     Sulcus Laceration:   Repaired:     Vaginal Laceration:   Repaired:     Cervical Laceration:   Repaired:     Repair suture:       Repair # of packets:       Last Value - EBL - Nursing (mL):       Sum - EBL - Nursing (mL): 0     Last Value - EBL - Anesthesia (mL):        Calculated QBL (mL): 250        Vaginal Sweep Performed:       Surgicount Correct:         Other providers:            Details (if applicable):  Trial of Labor No    Categorization: Repeat    Priority: Routine   Indications for : Repeat Section   Incision Type: low transverse     Additional  information:  Forceps:    Vacuum:    Breech:    Observed anomalies    Other (Comments):        APPENDIX: [facility] Births Below 39 Weeks Gestation Worksheet  Reason(s) for Delivery (check all that apply):     Fetal malformation or congenital anomaly or disorder - A physical defect present in a fetus diagnosed either by amniocentesis or ultrasound. These can be caused by genetic or prenatal events or exposures. (Fetal umbilical cord cyst vs hernia with ascites)

## 2022-11-14 NOTE — ANESTHESIA PROCEDURE NOTES
Spinal    Diagnosis: IUP@ 37 WKS  Patient location during procedure: OR  Start time: 11/14/2022 7:44 AM  Timeout: 11/14/2022 7:43 AM  End time: 11/14/2022 7:48 AM    Staffing  Authorizing Provider: Vance Santana CRNA  Performing Provider: Vance Santana CRNA    Preanesthetic Checklist  Completed: patient identified, IV checked, site marked, risks and benefits discussed, surgical consent, monitors and equipment checked, pre-op evaluation and timeout performed  Spinal Block  Patient position: sitting  Prep: Betadine  Patient monitoring: heart rate, cardiac monitor, frequent blood pressure checks and continuous pulse ox  Approach: midline  Location: L3-4  Injection technique: lumbar puncture  CSF Fluid: clear free-flowing CSF  Needle  Needle type: pencil-tip   Needle gauge: 25 G  Needle length: 3.5 in  Additional Documentation: negative aspiration for heme and no paresthesia on injection  Needle localization: anatomical landmarks  Assessment  Sensory level: T4   Dermatomal levels determined by pinch or prick  Ease of block: easy  Patient's tolerance of the procedure: comfortable throughout block and no complaints  Medications:    Medications: bupivacaine (pf) (MARCAINE) injection 0.75% - Intraspinal, Left Back   1.4 mL - 11/14/2022 7:47:00 AM

## 2022-11-14 NOTE — TRANSFER OF CARE
Anesthesia Transfer of Care Note    Patient: Khris Shiekh    Procedure(s) Performed: Procedure(s) (LRB):   SECTION (N/A)    Patient location: PACU    Anesthesia Type: MAC    Transport from OR: Transported from OR on room air with adequate spontaneous ventilation    Post pain: adequate analgesia    Post assessment: no apparent anesthetic complications    Post vital signs: stable    Level of consciousness: awake    Nausea/Vomiting: no nausea/vomiting    Complications: none    Transfer of care protocol was followed      Last vitals:   116/59  16 RR  37 C TEMP  99% O2 SAT  96 HR

## 2022-11-14 NOTE — NURSING
Pt to rm 443 per WC for scheduled repeat C/S. CHG bath done. UDS collected. Gowned to bed. TEDs and SCDS on. Pt npo. Denies pain. Reports lots of fetal movement. No LOF or bleeding. Pre and post op teaching done. Questions answered. SO present.

## 2022-11-15 LAB
BASOPHILS # BLD AUTO: 0.04 K/UL (ref 0–0.2)
BASOPHILS NFR BLD: 0.3 % (ref 0–1.9)
DIFFERENTIAL METHOD: ABNORMAL
EOSINOPHIL # BLD AUTO: 0.1 K/UL (ref 0–0.5)
EOSINOPHIL NFR BLD: 0.4 % (ref 0–8)
ERYTHROCYTE [DISTWIDTH] IN BLOOD BY AUTOMATED COUNT: 13 % (ref 11.5–14.5)
HCT VFR BLD AUTO: 33.3 % (ref 37–48.5)
HGB BLD-MCNC: 10.6 G/DL (ref 12–16)
IMM GRANULOCYTES # BLD AUTO: 0.07 K/UL (ref 0–0.04)
IMM GRANULOCYTES NFR BLD AUTO: 0.5 % (ref 0–0.5)
LYMPHOCYTES # BLD AUTO: 2.4 K/UL (ref 1–4.8)
LYMPHOCYTES NFR BLD: 17.1 % (ref 18–48)
MCH RBC QN AUTO: 26 PG (ref 27–31)
MCHC RBC AUTO-ENTMCNC: 31.8 G/DL (ref 32–36)
MCV RBC AUTO: 82 FL (ref 82–98)
MONOCYTES # BLD AUTO: 1.1 K/UL (ref 0.3–1)
MONOCYTES NFR BLD: 8.2 % (ref 4–15)
NEUTROPHILS # BLD AUTO: 10.1 K/UL (ref 1.8–7.7)
NEUTROPHILS NFR BLD: 73.5 % (ref 38–73)
NRBC BLD-RTO: 0 /100 WBC
PLATELET # BLD AUTO: 207 K/UL (ref 150–450)
PMV BLD AUTO: 10.7 FL (ref 9.2–12.9)
RBC # BLD AUTO: 4.07 M/UL (ref 4–5.4)
WBC # BLD AUTO: 13.78 K/UL (ref 3.9–12.7)

## 2022-11-15 PROCEDURE — 63600175 PHARM REV CODE 636 W HCPCS: Performed by: OBSTETRICS & GYNECOLOGY

## 2022-11-15 PROCEDURE — 90471 IMMUNIZATION ADMIN: CPT | Performed by: OBSTETRICS & GYNECOLOGY

## 2022-11-15 PROCEDURE — 36415 COLL VENOUS BLD VENIPUNCTURE: CPT | Performed by: OBSTETRICS & GYNECOLOGY

## 2022-11-15 PROCEDURE — 99233 PR SUBSEQUENT HOSPITAL CARE,LEVL III: ICD-10-PCS | Mod: ,,, | Performed by: OBSTETRICS & GYNECOLOGY

## 2022-11-15 PROCEDURE — 90715 TDAP VACCINE 7 YRS/> IM: CPT | Performed by: OBSTETRICS & GYNECOLOGY

## 2022-11-15 PROCEDURE — 11200000 HC OBSETRICS PRIVATE ROOM

## 2022-11-15 PROCEDURE — 85025 COMPLETE CBC W/AUTO DIFF WBC: CPT | Performed by: OBSTETRICS & GYNECOLOGY

## 2022-11-15 PROCEDURE — 99233 SBSQ HOSP IP/OBS HIGH 50: CPT | Mod: ,,, | Performed by: OBSTETRICS & GYNECOLOGY

## 2022-11-15 PROCEDURE — 25000003 PHARM REV CODE 250: Performed by: OBSTETRICS & GYNECOLOGY

## 2022-11-15 RX ADMIN — IBUPROFEN 600 MG: 600 TABLET ORAL at 06:11

## 2022-11-15 RX ADMIN — IBUPROFEN 600 MG: 600 TABLET ORAL at 05:11

## 2022-11-15 RX ADMIN — DOCUSATE SODIUM 200 MG: 100 CAPSULE, LIQUID FILLED ORAL at 08:11

## 2022-11-15 RX ADMIN — IBUPROFEN 600 MG: 600 TABLET ORAL at 11:11

## 2022-11-15 RX ADMIN — PRENATAL VIT W/ FE FUMARATE-FA TAB 27-0.8 MG 1 TABLET: 27-0.8 TAB at 08:11

## 2022-11-15 RX ADMIN — IBUPROFEN 600 MG: 600 TABLET ORAL at 12:11

## 2022-11-15 RX ADMIN — TETANUS TOXOID, REDUCED DIPHTHERIA TOXOID AND ACELLULAR PERTUSSIS VACCINE, ADSORBED 0.5 ML: 5; 2.5; 8; 8; 2.5 SUSPENSION INTRAMUSCULAR at 05:11

## 2022-11-15 RX ADMIN — MUPIROCIN: 20 OINTMENT TOPICAL at 08:11

## 2022-11-15 RX ADMIN — DOCUSATE SODIUM 200 MG: 100 CAPSULE, LIQUID FILLED ORAL at 09:11

## 2022-11-15 NOTE — PLAN OF CARE
Pt VSS. Pt doing well post C/S. No prn pain med needed overnight. Remains afebrile. Michael regular diet. Abd dressing remains dry and intact. Lochia rubra light. Pt voiding adequately and passing flatus. Ambulating well. Encouraged to call with all needs

## 2022-11-15 NOTE — PLAN OF CARE
Problem:  Fall Injury Risk  Goal: Absence of Fall, Infant Drop and Related Injury  Outcome: Ongoing, Progressing     Problem: Adult Inpatient Plan of Care  Goal: Plan of Care Review  Outcome: Ongoing, Progressing  Goal: Patient-Specific Goal (Individualized)  Outcome: Ongoing, Progressing  Goal: Absence of Hospital-Acquired Illness or Injury  Outcome: Ongoing, Progressing  Goal: Optimal Comfort and Wellbeing  Outcome: Ongoing, Progressing  Goal: Readiness for Transition of Care  Outcome: Ongoing, Progressing     Problem: Bariatric Environmental Safety  Goal: Safety Maintained with Care  Outcome: Ongoing, Progressing     Problem: Infection  Goal: Absence of Infection Signs and Symptoms  Outcome: Ongoing, Progressing

## 2022-11-15 NOTE — PLAN OF CARE
"[facility] - Labor And Delivery  OB Initial Discharge Assessment       Primary Care Provider: Eduin Mullen MD    Expected Discharge Date:     Initial Assessment (most recent)       OB Discharge Planning Assessment - 11/15/22 1324          OB Discharge Planning Assessment    Assessment Type Discharge Planning Assessment     Source of Information patient     Verified Demographic and Insurance Information Yes     Insurance Medicaid     Medicaid [facility]     Medicaid Insurance Primary     Spiritual Affiliation --   The patient stated that she does not have any spiritual affiliation.    Lives With child(svitlana), dependent;significant other     Name(s) of Who Lives With Patient Marcello (significant other), Curtis (son), and Prateek (son)     Number people in home 4     Relationship Status Engaged     Name of Support/Comfort Primary Source Marcello (significant other)     Other children (include names and ages) Curtis (son)-8 and Prateek (son)-2     Employed No     Highest Level of Education Some High School   The patient stated that her highest level of education is the tenth grade.    Is Father signing the birth certificate Yes     Father's Address [address] [place] [address] 3     Father's Employer [employer]     Father's Job Title      Family Involvement High   The patient stated that she has a "good support system."    Primary Contact Name and Number Fernanda (grandparent)-[phone], Rachel (mother)-[phone], and Marcello (significant other)     Received Prenatal Care Yes     Transportation Anticipated family or friend will provide     Receive WIC Benefits Already certified, will apply for new born      Arrangements Self;Family     Infant Feeding Plan formula feeding     Does baby have crib or safe sleep space? Yes   The patient stated that she has a bassinet.    Do you have a car seat? Yes     Has other essential care items? Clothing;Bottles;Diapers     Pediatrician Dr. Mullen     " Resource/Environmental Concerns none     Resources/Education Provided --   The patient politely refused community resources.    DME Needed Upon Discharge  none     DCFS Notified     DCFS Notified According to the patient's medical record, DCFS was notified by , Aida IYER     Discharge Plan A Home;Home with family     Discharge Plan B Home;Home with family                 Initial discharge assessment is completed. I spoke to the patient in her room. She was awake, alert, and oriented to the questions being asked. The patient lives in an apartment with her family. She has two other children, Curtis-8, and Prateek-2. The patient currently does not work, but her significant other, Marecllo, is a  at [employer]. She stated that she does receive foodstamps, and she plans to apply for [facility] services for her . The patient currently has all the essential care items she needs for her .     The patient and I had a discussion about her positive THC drug screening three months ago. She was informed that I will be following the 's meconium, and if it comes back positive, then DCFS will be notified. She verbalized understanding. She politely decline community resources.      I informed the patient that she can contact me if she has any further questions or concerns.             Healthcare Directives:   Advance Directive  (If Adv Dir status is received, view document under Adv Dir in header or Chart Review Media tab): Patient does not have Advance Directive, declines information.

## 2022-11-16 VITALS
SYSTOLIC BLOOD PRESSURE: 123 MMHG | OXYGEN SATURATION: 100 % | DIASTOLIC BLOOD PRESSURE: 63 MMHG | TEMPERATURE: 98 F | HEART RATE: 101 BPM | HEIGHT: 63 IN | RESPIRATION RATE: 16 BRPM | WEIGHT: 232 LBS | BODY MASS INDEX: 41.11 KG/M2

## 2022-11-16 PROCEDURE — 25000003 PHARM REV CODE 250: Performed by: OBSTETRICS & GYNECOLOGY

## 2022-11-16 PROCEDURE — 99239 HOSP IP/OBS DSCHRG MGMT >30: CPT | Mod: ,,, | Performed by: OBSTETRICS & GYNECOLOGY

## 2022-11-16 PROCEDURE — 99239 PR HOSPITAL DISCHARGE DAY,>30 MIN: ICD-10-PCS | Mod: ,,, | Performed by: OBSTETRICS & GYNECOLOGY

## 2022-11-16 RX ORDER — DOCUSATE SODIUM 100 MG/1
100 CAPSULE, LIQUID FILLED ORAL 2 TIMES DAILY
Qty: 60 CAPSULE | Refills: 1 | Status: SHIPPED | OUTPATIENT
Start: 2022-11-16 | End: 2024-02-28

## 2022-11-16 RX ORDER — IBUPROFEN 600 MG/1
600 TABLET ORAL EVERY 6 HOURS
Qty: 40 TABLET | Refills: 1 | Status: SHIPPED | OUTPATIENT
Start: 2022-11-16 | End: 2022-12-01 | Stop reason: SDUPTHER

## 2022-11-16 RX ORDER — OXYCODONE AND ACETAMINOPHEN 5; 325 MG/1; MG/1
1 TABLET ORAL EVERY 6 HOURS PRN
Qty: 20 TABLET | Refills: 0 | Status: SHIPPED | OUTPATIENT
Start: 2022-11-16 | End: 2024-02-28

## 2022-11-16 RX ADMIN — IBUPROFEN 600 MG: 600 TABLET ORAL at 05:11

## 2022-11-16 NOTE — NURSING
Patient sitting up in bed bonding with infant, VSS, discussed POC, scheduled medications, reporting pain, instructed to call for all needs or concerns, verbalized understanding, will bring in infant car seat for scheduled car seat challenge test for infant.

## 2022-11-16 NOTE — DISCHARGE SUMMARY
Discharge Summary Obstetrics/Gynecology    Admit Date: 2022    Discharge Date: 2022     Attending Physician: Cindy Garnica MD    Principle Problem:  delivery delivered    Other Secondary Diagnoses:  Patient Active Problem List    Diagnosis Date Noted    Encounter for suspected problem with amniotic cavity and membrane ruled out 10/17/2022    Fetal ascites causing disproportion 10/04/2022    Drug use affecting pregnancy in third trimester 2022    2. Obesity affecting pregnancy in third trimester 2022    1. Umbilical cord cyst during pregnancy, antepartum 2022     delivery delivered 2020    Previous  delivery affecting pregnancy 2020         Discharged Condition: stable    Delivery Information:  Information for the patient's :  Lawrence Sheikh [[id number]]     Delivery:    Episiotomy:     Lacerations:     Repair suture:     Repair # of packets:     Blood loss (ml):       Birth information:  YOB: 2021   Time of birth: 8:13 AM   Sex: male   Delivery type: , Low Transverse   Gestational Age: 37w3d    Delivery Clinician:      Other providers:       Additional  information:  Forceps:    Vacuum:    Breech:    Observed anomalies      Living?:           APGARS  One minute Five minutes Ten minutes   Skin color:         Heart rate:         Grimace:         Muscle tone:         Breathing:         Totals: 9  9        Placenta: Delivered:       appearance    Baby Birth Data:  2.707 kg (5 lb 15.5 oz)   male       Physical Exam:   Chest: Clear to auscultation   Cardiovascular: Regular rate and Rhythm. No tachycardia   Abd: soft, bowel sounds active, non-tender    Incision: Healing well. No erythema, significant drainage. Sutures intact.   Ext: no cyanosis, clubbing, edema      Hospital Course: Hospital Day: 3    2 Days Post-OpProcedure(s) (LRB):   SECTION (N/A)     Feeling well. Ambulating without problems. Tolerating regular  diet. Voiding without difficulty. Vital signs stable. She has been afebrile postoperatively. Lochia minimal.      Hospital Course:  Khris is a 27 y.o. now  by , due to previous section , postpartum day # 2 was admitted on 2022 for  delivery delivered. On initial assessment, vital signs were stable.  Patient was subsequently admitted to labor and delivery unit with signed consents.  Patient subsequently delivered a viable infant, please see delivery information below or full delivery note for further details. Pt was in stable} condition post delivery and was transferred to the Mother-Baby Unit. Her postpartum course was without complication. On discharge day, patient's pain is well controlled with oral pain medications. Pt is tolerating ambulation without SOB or CP, and PO diet without N/V. Reports lochia is minimal. Denies any HA, vision changes, LE swelling.  Pt in stable condition and ready for discharge, instructed to continue PNV, pain medications, and to follow up in the OB clinic in 2 week(s) with Cindy Garnica MD.            Significant Diagnostic Studies:  WBC   Date Value Ref Range Status   11/15/2022 13.78 (H) 3.90 - 12.70 K/uL Final     Hemoglobin   Date Value Ref Range Status   11/15/2022 10.6 (L) 12.0 - 16.0 g/dL Final     Hematocrit   Date Value Ref Range Status   11/15/2022 33.3 (L) 37.0 - 48.5 % Final       Group & Rh   Date Value Ref Range Status   2022 AB POS  Final       Immunization History   Administered Date(s) Administered    MMR 2020    Tdap 11/15/2022       Information for the patient's :  Lawrence Sheikh [[id number]]   No results found for this or any previous visit.     Treatments: Procedure(s) (LRB):   SECTION (N/A)     Disposition: Home or Self Care    Patient Instructions:  Current Discharge Medication List        START taking these medications    Details   ibuprofen (ADVIL,MOTRIN) 600 MG tablet Take 1 tablet (600 mg total) by  mouth every 6 (six) hours.  Qty: 40 tablet, Refills: 1      oxyCODONE-acetaminophen (PERCOCET) 5-325 mg per tablet Take 1 tablet by mouth every 6 (six) hours as needed for Pain.  Qty: 20 tablet, Refills: 0    Comments: Quantity prescribed more than 7 day supply? No           CONTINUE these medications which have CHANGED    Details   docusate sodium (COLACE) 100 MG capsule Take 1 capsule (100 mg total) by mouth 2 (two) times daily.  Qty: 60 capsule, Refills: 1           CONTINUE these medications which have NOT CHANGED    Details   ondansetron (ZOFRAN-ODT) 4 MG TbDL Take 1 tablet (4 mg total) by mouth every 8 (eight) hours as needed (nausea).  Qty: 30 tablet, Refills: 0    Associated Diagnoses: Nausea and vomiting in pregnancy      prenatal vit37/iron/folic acid (PRENATA ORAL) Take 1 tablet by mouth once daily.               Discharge Procedure Orders   CBC Auto Differential   Standing Status: Future Number of Occurrences: 1 Standing Exp. Date: 01/08/24     Comprehensive Metabolic Panel   Standing Status: Future Number of Occurrences: 1 Standing Exp. Date: 01/08/24     Diet Adult Regular     Pelvic Rest     No driving until:   Order Comments: 2 weeks     Lifting restrictions     Notify your health care provider if you experience any of the following:  temperature >100.4     Notify your health care provider if you experience any of the following:  persistent nausea and vomiting or diarrhea     Notify your health care provider if you experience any of the following:  severe uncontrolled pain     Notify your health care provider if you experience any of the following:  redness, tenderness, or signs of infection (pain, swelling, redness, odor or green/yellow discharge around incision site)     Notify your health care provider if you experience any of the following:  difficulty breathing or increased cough     Notify your health care provider if you experience any of the following:  severe persistent headache     Notify  your health care provider if you experience any of the following:  persistent dizziness, light-headedness, or visual disturbances      Remove dressing in 72 hours        Follow-up Information       Cindy Garnica MD Follow up in 2 week(s).    Specialty: Obstetrics and Gynecology  Why: wound check, follow up  Contact information:  [address] [place] [id number]  [phone]                               Cindy Garnica MD FACOG    11/16/2022  7:55 AM

## 2022-11-16 NOTE — NURSING
Discharge instructions provided, all questions answered. Patient verbalized understanding. Instructed to call for wheelchair when ready.

## 2022-11-16 NOTE — DISCHARGE INSTRUCTIONS
Pelvic rest for 6 weeks - no intercourse, tampons, douching    No heavy lifting more than 10 lbs or strenuous exercise for 6 weeks    No tub bathing -take SHOWERS ONLY until released by physician    Please  medications and continue to take your medications as prescribed    Remove top dressing on 11/18/22 but leave steri-strips in place    Keep incision clean and dry    No driving for at least two weeks    Follow up with Dr. Garnica in 2 weeks for recheck, on Friday December 2, 2022 at 1045    Follow up in ER should any emergencies develop such as severe chest pain, severe abdominal pain, fever of 101.4 not relieved with medication, increase in vaginal bleeding, foul drainage from incision site.         Thank you for choosing [facility]!

## 2022-11-16 NOTE — NURSING
Patient off unit for discharge via wheelchair in stable condition with baby. Accompanied by nurse

## 2022-11-16 NOTE — PLAN OF CARE
Patient rested well this shift, no complaints of pain voiced, voids appropriately, lochia remains scant, dressing dry and intact, anxious for discharge.

## 2022-11-17 NOTE — ANESTHESIA POSTPROCEDURE EVALUATION
Anesthesia Post Evaluation    Patient: Khris Sheikh    Procedure(s) Performed: Procedure(s) (LRB):   SECTION (N/A)    Final Anesthesia Type: spinal      Patient location during evaluation: labor & delivery  Patient participation: Yes- Able to Participate  Level of consciousness: awake  Post-procedure vital signs: reviewed and stable  Pain management: adequate  Airway patency: patent    PONV status at discharge: No PONV  Anesthetic complications: no      Cardiovascular status: blood pressure returned to baseline  Respiratory status: spontaneous ventilation  Hydration status: euvolemic  Follow-up not needed.          Vitals Value Taken Time   /63 22 0710   Temp 36.8 °C (98.2 °F) 22 0710   Pulse 101 22 0710   Resp 16 22 0710   SpO2 100 % 11/15/22 1936         Event Time   Out of Recovery 09:05:00         Pain/Champ Score: Pain Rating Prior to Med Admin: 0 (2022  5:50 AM)  Pain Rating Post Med Admin: 0 (2022  6:29 AM)

## 2022-12-01 ENCOUNTER — TELEPHONE (OUTPATIENT)
Dept: OBSTETRICS AND GYNECOLOGY | Facility: CLINIC | Age: 27
End: 2022-12-01
Payer: MEDICAID

## 2022-12-01 RX ORDER — IBUPROFEN 600 MG/1
600 TABLET ORAL EVERY 6 HOURS
Qty: 40 TABLET | Refills: 1 | Status: SHIPPED | OUTPATIENT
Start: 2022-12-01 | End: 2024-02-28

## 2022-12-02 ENCOUNTER — OFFICE VISIT (OUTPATIENT)
Dept: OBSTETRICS AND GYNECOLOGY | Facility: CLINIC | Age: 27
End: 2022-12-02
Payer: MEDICAID

## 2022-12-02 VITALS — SYSTOLIC BLOOD PRESSURE: 138 MMHG | WEIGHT: 214 LBS | DIASTOLIC BLOOD PRESSURE: 72 MMHG | BODY MASS INDEX: 37.91 KG/M2

## 2022-12-02 DIAGNOSIS — Z09 POSTOP CHECK: ICD-10-CM

## 2022-12-02 DIAGNOSIS — Z30.41 SURVEILLANCE OF CONTRACEPTIVE PILL: ICD-10-CM

## 2022-12-02 PROCEDURE — 99024 PR POST-OP FOLLOW-UP VISIT: ICD-10-PCS | Mod: ,,, | Performed by: OBSTETRICS & GYNECOLOGY

## 2022-12-02 PROCEDURE — 3075F SYST BP GE 130 - 139MM HG: CPT | Mod: CPTII,,, | Performed by: OBSTETRICS & GYNECOLOGY

## 2022-12-02 PROCEDURE — 3008F PR BODY MASS INDEX (BMI) DOCUMENTED: ICD-10-PCS | Mod: CPTII,,, | Performed by: OBSTETRICS & GYNECOLOGY

## 2022-12-02 PROCEDURE — 3008F BODY MASS INDEX DOCD: CPT | Mod: CPTII,,, | Performed by: OBSTETRICS & GYNECOLOGY

## 2022-12-02 PROCEDURE — 99212 OFFICE O/P EST SF 10 MIN: CPT | Mod: PBBFAC,TH | Performed by: OBSTETRICS & GYNECOLOGY

## 2022-12-02 PROCEDURE — 99999 PR PBB SHADOW E&M-EST. PATIENT-LVL II: ICD-10-PCS | Mod: PBBFAC,,, | Performed by: OBSTETRICS & GYNECOLOGY

## 2022-12-02 PROCEDURE — 3075F PR MOST RECENT SYSTOLIC BLOOD PRESS GE 130-139MM HG: ICD-10-PCS | Mod: CPTII,,, | Performed by: OBSTETRICS & GYNECOLOGY

## 2022-12-02 PROCEDURE — 1159F PR MEDICATION LIST DOCUMENTED IN MEDICAL RECORD: ICD-10-PCS | Mod: CPTII,,, | Performed by: OBSTETRICS & GYNECOLOGY

## 2022-12-02 PROCEDURE — 99024 POSTOP FOLLOW-UP VISIT: CPT | Mod: ,,, | Performed by: OBSTETRICS & GYNECOLOGY

## 2022-12-02 PROCEDURE — 1160F PR REVIEW ALL MEDS BY PRESCRIBER/CLIN PHARMACIST DOCUMENTED: ICD-10-PCS | Mod: CPTII,,, | Performed by: OBSTETRICS & GYNECOLOGY

## 2022-12-02 PROCEDURE — 3078F PR MOST RECENT DIASTOLIC BLOOD PRESSURE < 80 MM HG: ICD-10-PCS | Mod: CPTII,,, | Performed by: OBSTETRICS & GYNECOLOGY

## 2022-12-02 PROCEDURE — 1159F MED LIST DOCD IN RCRD: CPT | Mod: CPTII,,, | Performed by: OBSTETRICS & GYNECOLOGY

## 2022-12-02 PROCEDURE — 3078F DIAST BP <80 MM HG: CPT | Mod: CPTII,,, | Performed by: OBSTETRICS & GYNECOLOGY

## 2022-12-02 PROCEDURE — 99999 PR PBB SHADOW E&M-EST. PATIENT-LVL II: CPT | Mod: PBBFAC,,, | Performed by: OBSTETRICS & GYNECOLOGY

## 2022-12-02 PROCEDURE — 1160F RVW MEDS BY RX/DR IN RCRD: CPT | Mod: CPTII,,, | Performed by: OBSTETRICS & GYNECOLOGY

## 2022-12-02 RX ORDER — NORGESTIMATE AND ETHINYL ESTRADIOL 0.25-0.035
1 KIT ORAL DAILY
Qty: 28 TABLET | Refills: 11 | Status: SHIPPED | OUTPATIENT
Start: 2022-12-02 | End: 2023-01-02 | Stop reason: SDUPTHER

## 2022-12-02 NOTE — PROGRESS NOTES
Subjective:    Patient ID: Khris Sheikh is a 27 y.o. y.o. female    Chief Complaint:   Chief Complaint   Patient presents with    Post-op Evaluation     Pt would like to start ocp       History of Present Illness:  Khris presents today for follow up of  about 2 and half weeks ago.  Patient states she is feeling well and bonding well with baby.  Currently bottle-feeding and desires to begin birth control.  No crying spells or symptoms of depression.      Review of Systems   Constitutional:  Negative for chills, fatigue and fever.   Respiratory:  Negative for shortness of breath.    Cardiovascular:  Negative for chest pain.   Gastrointestinal:  Negative for abdominal pain, constipation, diarrhea and nausea.   Genitourinary:  Positive for vaginal bleeding. Negative for bladder incontinence, dysuria, hot flashes and pelvic pain.   Neurological:  Negative for headaches.   Psychiatric/Behavioral:  Negative for depression.        Objective:    Vital Signs:  Vitals:    22 1114   BP: 138/72     Wt Readings from Last 1 Encounters:   22 97.1 kg (214 lb)     Body mass index is 37.91 kg/m².    Physical Exam:  General:  alert, no distress   Skin:  Skin color, texture, turgor normal. No rashes or lesions   Abdomen:  Soft, nontender   Extremities: No cyanosis, clubbing, edema      Limitations reiterated to patient.  Reassurance and encouragement given          Assessment:      1.  delivery delivered    2. Postop check    3. Surveillance of contraceptive pill          Plan:       delivery delivered    Postop check    Surveillance of contraceptive pill    Other orders  -     norgestimate-ethinyl estradioL (ORTHO-CYCLEN) 0.25-35 mg-mcg per tablet; Take 1 tablet by mouth once daily.  Dispense: 28 tablet; Refill: 11  Rtc 4 weeks pp exam       Cindy Garnica MD, FACOG   2022 11:23 AM

## 2023-01-16 PROBLEM — Z03.71 ENCOUNTER FOR SUSPECTED PROBLEM WITH AMNIOTIC CAVITY AND MEMBRANE RULED OUT: Status: RESOLVED | Noted: 2022-10-17 | Resolved: 2023-01-16

## 2023-05-16 NOTE — ASSESSMENT & PLAN NOTE
Shabana from [facility] was notified and verbalized understanding of providers response. Shabana stated she faxed over the patient's current med list recently. (fax # was verified) Shabana stated they were able to get the discharge paper work. They are needing Dr Yousif to verify meds. She will refax to Dr Yousif's office today.  Shabana  was in agreement and denied further questions or concerns.    To Dr Yousif's office   Thank you    There are no known benefits of marijuana use in pregnancy, and there is no known safe amount of cannabis in pregnancy. There are possible risks, however impact is unclear and sometimes conflicting due in part to confounding by other substance use. Women who smoke marijuana at least weekly are at higher risk of delivering a small for gestational age infant. Additionally, several studies note smaller birth lengths and head circumference in exposed children. Children exposed to marijuana in utero may have lower scores on reading and spelling tests and have been shown to be at higher risk for substance abuse and developmental abnormalities (especially in visuospatial function and decreased attention span/behavioral problems). Women should avoid marijuana use while breastfeeding. Patient was counseled regarding these risks and cessation was encouraged. She states she last used marijuana in May.

## 2023-06-13 ENCOUNTER — PATIENT MESSAGE (OUTPATIENT)
Dept: RESEARCH | Facility: HOSPITAL | Age: 28
End: 2023-06-13
Payer: MEDICAID

## 2023-06-20 ENCOUNTER — PATIENT MESSAGE (OUTPATIENT)
Dept: RESEARCH | Facility: HOSPITAL | Age: 28
End: 2023-06-20
Payer: MEDICAID

## 2023-06-27 ENCOUNTER — PATIENT MESSAGE (OUTPATIENT)
Dept: RESEARCH | Facility: HOSPITAL | Age: 28
End: 2023-06-27
Payer: MEDICAID

## 2023-09-19 ENCOUNTER — OFFICE VISIT (OUTPATIENT)
Dept: OBSTETRICS AND GYNECOLOGY | Facility: CLINIC | Age: 28
End: 2023-09-19
Payer: MEDICAID

## 2023-09-19 VITALS
HEIGHT: 63 IN | SYSTOLIC BLOOD PRESSURE: 117 MMHG | DIASTOLIC BLOOD PRESSURE: 62 MMHG | BODY MASS INDEX: 40.93 KG/M2 | HEART RATE: 75 BPM | WEIGHT: 231 LBS

## 2023-09-19 DIAGNOSIS — Z30.013 ENCOUNTER FOR INITIAL PRESCRIPTION OF INJECTABLE CONTRACEPTIVE: ICD-10-CM

## 2023-09-19 DIAGNOSIS — Z32.02 PREGNANCY TEST NEGATIVE: ICD-10-CM

## 2023-09-19 DIAGNOSIS — N92.6 MISSED MENSES: ICD-10-CM

## 2023-09-19 DIAGNOSIS — N92.6 IRREGULAR PERIODS/MENSTRUAL CYCLES: Primary | ICD-10-CM

## 2023-09-19 DIAGNOSIS — Z30.42 DEPO-PROVERA CONTRACEPTIVE STATUS: ICD-10-CM

## 2023-09-19 LAB
ALBUMIN SERPL BCP-MCNC: 3.7 G/DL (ref 3.5–5.2)
ALP SERPL-CCNC: 103 U/L (ref 55–135)
ALT SERPL W/O P-5'-P-CCNC: 16 U/L (ref 10–44)
ANION GAP SERPL CALC-SCNC: 6 MMOL/L (ref 3–11)
AST SERPL-CCNC: 7 U/L (ref 10–40)
B-HCG UR QL: NEGATIVE
BASOPHILS # BLD AUTO: 0.04 K/UL (ref 0–0.2)
BASOPHILS NFR BLD: 0.4 % (ref 0–1.9)
BILIRUB SERPL-MCNC: 0.4 MG/DL (ref 0.1–1)
BUN SERPL-MCNC: 12 MG/DL (ref 6–20)
CALCIUM SERPL-MCNC: 8.6 MG/DL (ref 8.7–10.5)
CHLORIDE SERPL-SCNC: 111 MMOL/L (ref 95–110)
CO2 SERPL-SCNC: 24 MMOL/L (ref 23–29)
CREAT SERPL-MCNC: 0.9 MG/DL (ref 0.5–1.4)
CTP QC/QA: YES
DIFFERENTIAL METHOD: NORMAL
EOSINOPHIL # BLD AUTO: 0.1 K/UL (ref 0–0.5)
EOSINOPHIL NFR BLD: 0.8 % (ref 0–8)
ERYTHROCYTE [DISTWIDTH] IN BLOOD BY AUTOMATED COUNT: 13 % (ref 11.5–14.5)
EST. GFR  (NO RACE VARIABLE): >60 ML/MIN/1.73 M^2
ESTIMATED AVG GLUCOSE: 94 MG/DL (ref 68–131)
GLUCOSE SERPL-MCNC: 93 MG/DL (ref 70–110)
HBA1C MFR BLD: 4.9 % (ref 4–5.6)
HCG INTACT+B SERPL-ACNC: <1 MIU/ML
HCT VFR BLD AUTO: 40.9 % (ref 37–48.5)
HGB BLD-MCNC: 13.2 G/DL (ref 12–16)
IMM GRANULOCYTES # BLD AUTO: 0.04 K/UL (ref 0–0.04)
IMM GRANULOCYTES NFR BLD AUTO: 0.4 % (ref 0–0.5)
INSULIN COLLECTION INTERVAL: 0
INSULIN SERPL-ACNC: 7.1 UU/ML
LYMPHOCYTES # BLD AUTO: 2.8 K/UL (ref 1–4.8)
LYMPHOCYTES NFR BLD: 26.8 % (ref 18–48)
MCH RBC QN AUTO: 27.6 PG (ref 27–31)
MCHC RBC AUTO-ENTMCNC: 32.3 G/DL (ref 32–36)
MCV RBC AUTO: 85 FL (ref 82–98)
MONOCYTES # BLD AUTO: 0.7 K/UL (ref 0.3–1)
MONOCYTES NFR BLD: 6.3 % (ref 4–15)
NEUTROPHILS # BLD AUTO: 6.9 K/UL (ref 1.8–7.7)
NEUTROPHILS NFR BLD: 65.3 % (ref 38–73)
NRBC BLD-RTO: 0 /100 WBC
PLATELET # BLD AUTO: 307 K/UL (ref 150–450)
PMV BLD AUTO: 10.7 FL (ref 9.2–12.9)
POTASSIUM SERPL-SCNC: 3.8 MMOL/L (ref 3.5–5.1)
PROLACTIN SERPL IA-MCNC: 54.1 NG/ML (ref 5.2–26.5)
PROT SERPL-MCNC: 7.4 G/DL (ref 6–8.4)
RBC # BLD AUTO: 4.79 M/UL (ref 4–5.4)
SODIUM SERPL-SCNC: 141 MMOL/L (ref 136–145)
TSH SERPL DL<=0.005 MIU/L-ACNC: 1.41 UIU/ML (ref 0.4–4)
WBC # BLD AUTO: 10.55 K/UL (ref 3.9–12.7)

## 2023-09-19 PROCEDURE — 99999PBSHW POCT URINE PREGNANCY: ICD-10-PCS | Mod: PBBFAC,,,

## 2023-09-19 PROCEDURE — 99213 OFFICE O/P EST LOW 20 MIN: CPT | Mod: PBBFAC | Performed by: OBSTETRICS & GYNECOLOGY

## 2023-09-19 PROCEDURE — 1159F PR MEDICATION LIST DOCUMENTED IN MEDICAL RECORD: ICD-10-PCS | Mod: CPTII,,, | Performed by: OBSTETRICS & GYNECOLOGY

## 2023-09-19 PROCEDURE — 85025 COMPLETE CBC W/AUTO DIFF WBC: CPT | Performed by: OBSTETRICS & GYNECOLOGY

## 2023-09-19 PROCEDURE — 1160F RVW MEDS BY RX/DR IN RCRD: CPT | Mod: CPTII,,, | Performed by: OBSTETRICS & GYNECOLOGY

## 2023-09-19 PROCEDURE — 99999PBSHW POCT URINE PREGNANCY: Mod: PBBFAC,,,

## 2023-09-19 PROCEDURE — 3074F SYST BP LT 130 MM HG: CPT | Mod: CPTII,,, | Performed by: OBSTETRICS & GYNECOLOGY

## 2023-09-19 PROCEDURE — 84443 ASSAY THYROID STIM HORMONE: CPT | Performed by: OBSTETRICS & GYNECOLOGY

## 2023-09-19 PROCEDURE — 3008F PR BODY MASS INDEX (BMI) DOCUMENTED: ICD-10-PCS | Mod: CPTII,,, | Performed by: OBSTETRICS & GYNECOLOGY

## 2023-09-19 PROCEDURE — 99999PBSHW PR PBB SHADOW TECHNICAL ONLY FILED TO HB: Mod: PBBFAC,,,

## 2023-09-19 PROCEDURE — 96372 THER/PROPH/DIAG INJ SC/IM: CPT | Mod: PBBFAC

## 2023-09-19 PROCEDURE — 82670 ASSAY OF TOTAL ESTRADIOL: CPT | Performed by: OBSTETRICS & GYNECOLOGY

## 2023-09-19 PROCEDURE — 80053 COMPREHEN METABOLIC PANEL: CPT | Performed by: OBSTETRICS & GYNECOLOGY

## 2023-09-19 PROCEDURE — 83036 HEMOGLOBIN GLYCOSYLATED A1C: CPT | Performed by: OBSTETRICS & GYNECOLOGY

## 2023-09-19 PROCEDURE — 99999 PR PBB SHADOW E&M-EST. PATIENT-LVL III: ICD-10-PCS | Mod: PBBFAC,,, | Performed by: OBSTETRICS & GYNECOLOGY

## 2023-09-19 PROCEDURE — 3008F BODY MASS INDEX DOCD: CPT | Mod: CPTII,,, | Performed by: OBSTETRICS & GYNECOLOGY

## 2023-09-19 PROCEDURE — 1159F MED LIST DOCD IN RCRD: CPT | Mod: CPTII,,, | Performed by: OBSTETRICS & GYNECOLOGY

## 2023-09-19 PROCEDURE — 84403 ASSAY OF TOTAL TESTOSTERONE: CPT | Performed by: OBSTETRICS & GYNECOLOGY

## 2023-09-19 PROCEDURE — 99999 PR PBB SHADOW E&M-EST. PATIENT-LVL III: CPT | Mod: PBBFAC,,, | Performed by: OBSTETRICS & GYNECOLOGY

## 2023-09-19 PROCEDURE — 83525 ASSAY OF INSULIN: CPT | Performed by: OBSTETRICS & GYNECOLOGY

## 2023-09-19 PROCEDURE — 84146 ASSAY OF PROLACTIN: CPT | Performed by: OBSTETRICS & GYNECOLOGY

## 2023-09-19 PROCEDURE — 1160F PR REVIEW ALL MEDS BY PRESCRIBER/CLIN PHARMACIST DOCUMENTED: ICD-10-PCS | Mod: CPTII,,, | Performed by: OBSTETRICS & GYNECOLOGY

## 2023-09-19 PROCEDURE — 84702 CHORIONIC GONADOTROPIN TEST: CPT | Performed by: OBSTETRICS & GYNECOLOGY

## 2023-09-19 PROCEDURE — 81025 URINE PREGNANCY TEST: CPT | Mod: PBBFAC

## 2023-09-19 PROCEDURE — 83002 ASSAY OF GONADOTROPIN (LH): CPT | Performed by: OBSTETRICS & GYNECOLOGY

## 2023-09-19 PROCEDURE — 99213 OFFICE O/P EST LOW 20 MIN: CPT | Mod: S$PBB,,, | Performed by: OBSTETRICS & GYNECOLOGY

## 2023-09-19 PROCEDURE — 81025 URINE PREGNANCY TEST: CPT | Mod: PBBFAC | Performed by: OBSTETRICS & GYNECOLOGY

## 2023-09-19 PROCEDURE — 3078F DIAST BP <80 MM HG: CPT | Mod: CPTII,,, | Performed by: OBSTETRICS & GYNECOLOGY

## 2023-09-19 PROCEDURE — 84402 ASSAY OF FREE TESTOSTERONE: CPT | Performed by: OBSTETRICS & GYNECOLOGY

## 2023-09-19 PROCEDURE — 3044F PR MOST RECENT HEMOGLOBIN A1C LEVEL <7.0%: ICD-10-PCS | Mod: CPTII,,, | Performed by: OBSTETRICS & GYNECOLOGY

## 2023-09-19 PROCEDURE — 83001 ASSAY OF GONADOTROPIN (FSH): CPT | Performed by: OBSTETRICS & GYNECOLOGY

## 2023-09-19 PROCEDURE — 3074F PR MOST RECENT SYSTOLIC BLOOD PRESSURE < 130 MM HG: ICD-10-PCS | Mod: CPTII,,, | Performed by: OBSTETRICS & GYNECOLOGY

## 2023-09-19 PROCEDURE — 99213 PR OFFICE/OUTPT VISIT, EST, LEVL III, 20-29 MIN: ICD-10-PCS | Mod: S$PBB,,, | Performed by: OBSTETRICS & GYNECOLOGY

## 2023-09-19 PROCEDURE — 3078F PR MOST RECENT DIASTOLIC BLOOD PRESSURE < 80 MM HG: ICD-10-PCS | Mod: CPTII,,, | Performed by: OBSTETRICS & GYNECOLOGY

## 2023-09-19 PROCEDURE — 84144 ASSAY OF PROGESTERONE: CPT | Performed by: OBSTETRICS & GYNECOLOGY

## 2023-09-19 PROCEDURE — 36415 COLL VENOUS BLD VENIPUNCTURE: CPT | Mod: PBBFAC

## 2023-09-19 PROCEDURE — 3044F HG A1C LEVEL LT 7.0%: CPT | Mod: CPTII,,, | Performed by: OBSTETRICS & GYNECOLOGY

## 2023-09-19 RX ORDER — MEDROXYPROGESTERONE ACETATE 150 MG/ML
150 INJECTION, SUSPENSION INTRAMUSCULAR
Status: DISCONTINUED | OUTPATIENT
Start: 2023-09-19 | End: 2024-02-28

## 2023-09-19 RX ORDER — MEDROXYPROGESTERONE ACETATE 150 MG/ML
150 INJECTION, SUSPENSION INTRAMUSCULAR
Status: COMPLETED | OUTPATIENT
Start: 2023-09-19 | End: 2023-09-19

## 2023-09-19 RX ADMIN — MEDROXYPROGESTERONE ACETATE 150 MG: 150 INJECTION, SUSPENSION INTRAMUSCULAR at 11:09

## 2023-09-19 NOTE — PROGRESS NOTES
Pt seated at lab chair, 8 tubes drawn at right AC with 21G x 1 1/2 in. x1 attempts. Pressure applied to area and bandage placed. Pt tolerated well no c/o. UPT negative. Depo provera given to right deltoid. Pt tolerated well no c/o. Left clinic ambulatory and in stable condition.

## 2023-09-19 NOTE — PROGRESS NOTES
Subjective:    Patient ID: Khris Sheikh is a 27 y.o. y.o. female    Chief Complaint:   Chief Complaint   Patient presents with    Dysmenorrhea     States she hasnt had a period since stopping birthcontrol in July.       History of Present Illness:  Khris presents today for evaluation of missed menses.  States stopped taking her birth control a few months ago and has not had a cycle since.  She states she has taken pregnancy tests and they have been negative.    She stopped taking her birth control because it made her bleed for 2 weeks. She currently does not want to be pregnant      Review of Systems   Constitutional:  Negative for chills, fatigue and fever.   Respiratory:  Negative for shortness of breath.    Cardiovascular:  Negative for chest pain.   Gastrointestinal:  Negative for abdominal pain, constipation, diarrhea and nausea.   Genitourinary:  Positive for menstrual problem. Negative for bladder incontinence, dysuria, hot flashes, pelvic pain and vaginal bleeding.   Neurological:  Negative for headaches.   Psychiatric/Behavioral:  Negative for depression.          Objective:    Vital Signs:  Vitals:    09/19/23 1130   BP: 117/62   Pulse: 75     Wt Readings from Last 1 Encounters:   09/19/23 104.8 kg (231 lb)     Body mass index is 40.92 kg/m².    Physical Exam:  General:  alert, no distress   Skin:  Skin color, texture, turgor normal. No rashes or lesions   Abdomen:  Soft, obese, nontender   Extremities: No cyanosis, clubbing, edema        UPT:  Negative     Discussed causes of irregular cycles and treatment modalities.  Will obtain fasting labs today and patient desires to begin Depo-Provera injections.  Use and side effects discussed and all questions answered.  Depo-Provera given today and will follow up labs. See MAR    I spent a total of 20 minutes on the day of the visit.  This includes face to face time and non-face to face time preparing to see the patient (eg, review of tests), obtaining  and/or reviewing separately obtained history, documenting clinical information in the electronic or other health record, independently interpreting results and communicating results to the patient/family/caregiver, or care coordinator.           Assessment:      1. Irregular periods/menstrual cycles    2. Missed menses    3. Encounter for initial prescription of injectable contraceptive    4. Pregnancy test negative    5. Depo-Provera contraceptive status          Plan:      Irregular periods/menstrual cycles  -     CBC Auto Differential; Future; Expected date: 09/19/2023  -     Comprehensive Metabolic Panel; Future; Expected date: 09/19/2023  -     Prolactin; Future; Expected date: 09/19/2023  -     Estradiol; Future; Expected date: 09/19/2023  -     TSH; Future; Expected date: 09/19/2023  -     Progesterone; Future; Expected date: 09/19/2023  -     Follicle Stimulating Hormone; Future; Expected date: 09/19/2023  -     Luteinizing Hormone; Future; Expected date: 09/19/2023  -     Insulin, Random; Future; Expected date: 09/19/2023  -     Testosterone; Future; Expected date: 09/19/2023  -     Testosterone, Free; Future; Expected date: 09/19/2023  -     Hemoglobin A1C; Future; Expected date: 09/19/2023  -     POCT urine pregnancy  -     Prior authorization Order    Missed menses  -     HCG, Quantitative; Future; Expected date: 09/19/2023  -     POCT urine pregnancy    Encounter for initial prescription of injectable contraceptive  -     Prior authorization Order    Pregnancy test negative    Depo-Provera contraceptive status  -     medroxyPROGESTERone (DEPO-PROVERA) injection 150 mg  -     medroxyPROGESTERone (DEPO-PROVERA) injection 150 mg           Cindy Garnica MD, FACOG   09/19/2023 11:35 AM

## 2023-09-20 LAB
ESTRADIOL SERPL-MCNC: 42 PG/ML
FSH SERPL-ACNC: 5.52 MIU/ML
LH SERPL-ACNC: 3.2 MIU/ML
PROGEST SERPL-MCNC: 0.2 NG/ML
TESTOST SERPL-MCNC: 39 NG/DL (ref 5–73)

## 2023-09-21 NOTE — PROGRESS NOTES
Please call patient regarding results.  Prolactin elevated. Will continue depo provera--recheck levels in 3 months

## 2023-09-22 LAB — TESTOST FREE SERPL-MCNC: 0.7 PG/ML

## 2024-02-28 ENCOUNTER — LAB VISIT (OUTPATIENT)
Dept: LAB | Facility: HOSPITAL | Age: 29
End: 2024-02-28
Attending: OBSTETRICS & GYNECOLOGY
Payer: MEDICAID

## 2024-02-28 ENCOUNTER — OFFICE VISIT (OUTPATIENT)
Dept: OBSTETRICS AND GYNECOLOGY | Facility: CLINIC | Age: 29
End: 2024-02-28
Payer: MEDICAID

## 2024-02-28 VITALS
WEIGHT: 214 LBS | HEART RATE: 111 BPM | SYSTOLIC BLOOD PRESSURE: 134 MMHG | BODY MASS INDEX: 37.91 KG/M2 | DIASTOLIC BLOOD PRESSURE: 86 MMHG

## 2024-02-28 DIAGNOSIS — E22.1 HYPERPROLACTINEMIA: ICD-10-CM

## 2024-02-28 DIAGNOSIS — N64.3 GALACTORRHEA NOT ASSOCIATED WITH CHILDBIRTH: Primary | ICD-10-CM

## 2024-02-28 DIAGNOSIS — N64.3 GALACTORRHEA NOT ASSOCIATED WITH CHILDBIRTH: ICD-10-CM

## 2024-02-28 LAB — PROLACTIN SERPL IA-MCNC: 45.3 NG/ML (ref 5.2–26.5)

## 2024-02-28 PROCEDURE — 84146 ASSAY OF PROLACTIN: CPT | Performed by: OBSTETRICS & GYNECOLOGY

## 2024-02-28 PROCEDURE — 99212 OFFICE O/P EST SF 10 MIN: CPT | Mod: PBBFAC | Performed by: OBSTETRICS & GYNECOLOGY

## 2024-02-28 PROCEDURE — 3075F SYST BP GE 130 - 139MM HG: CPT | Mod: CPTII,,, | Performed by: OBSTETRICS & GYNECOLOGY

## 2024-02-28 PROCEDURE — 99999 PR PBB SHADOW E&M-EST. PATIENT-LVL II: CPT | Mod: PBBFAC,,, | Performed by: OBSTETRICS & GYNECOLOGY

## 2024-02-28 PROCEDURE — 3079F DIAST BP 80-89 MM HG: CPT | Mod: CPTII,,, | Performed by: OBSTETRICS & GYNECOLOGY

## 2024-02-28 PROCEDURE — 36415 COLL VENOUS BLD VENIPUNCTURE: CPT | Performed by: OBSTETRICS & GYNECOLOGY

## 2024-02-28 PROCEDURE — 3008F BODY MASS INDEX DOCD: CPT | Mod: CPTII,,, | Performed by: OBSTETRICS & GYNECOLOGY

## 2024-02-28 PROCEDURE — 99213 OFFICE O/P EST LOW 20 MIN: CPT | Mod: S$PBB,,, | Performed by: OBSTETRICS & GYNECOLOGY

## 2024-02-28 PROCEDURE — 1159F MED LIST DOCD IN RCRD: CPT | Mod: CPTII,,, | Performed by: OBSTETRICS & GYNECOLOGY

## 2024-02-28 NOTE — PROGRESS NOTES
Subjective:    Patient ID: Khris Sheikh is a 28 y.o. y.o. female    Chief Complaint:   Chief Complaint   Patient presents with    Breast Discharge     Pt breast have been leaking for a while now. Not on any birth control. Is not breast feeding. Has not had a cycle since November 2023       History of Present Illness:  Khris presents today for discussion of breast milk production. She is not breastfeeding and states the leakage has been occurring for several months. She has not had a cycle since November. Not on any birth control. No headaches or tunnel vision      Review of Systems   Constitutional:  Negative for chills and fever.   Respiratory:  Negative for shortness of breath.    Cardiovascular:  Negative for chest pain.   Gastrointestinal:  Negative for constipation.   Integumentary:  Positive for nipple discharge (milk leakage).   Neurological:  Negative for headaches.   Breast: Positive for nipple discharge (milk leakage).        Objective:    Vital Signs:  Vitals:    02/28/24 1124   BP: 134/86   Pulse: (!) 111     Wt Readings from Last 1 Encounters:   02/28/24 97.1 kg (214 lb)     Body mass index is 37.91 kg/m².    Physical Exam:  General:  alert, no distress    Discussion only     Prolactin level 09/2023 was 54    Will repeat prolactin. If elevated will refer to endocrine. In meantime recommend tight bra, cold cabbage leaves, and mucinex to help dry up milk supply.  All questions answered.    I spent a total of 20 minutes on the day of the visit.  This includes face to face time and non-face to face time preparing to see the patient (eg, review of tests), obtaining and/or reviewing separately obtained history, documenting clinical information in the electronic or other health record, independently interpreting results and communicating results to the patient/family/caregiver, or care coordinator.           Assessment:      1. Galactorrhea not associated with childbirth    2. Hyperprolactinemia           Plan:      Galactorrhea not associated with childbirth  -     Prolactin; Future; Expected date: 02/28/2024  -     Ambulatory referral/consult to Endocrinology; Future; Expected date: 03/07/2024    Hyperprolactinemia  -     Ambulatory referral/consult to Endocrinology; Future; Expected date: 03/07/2024           Cindy Garnica MD, FACOG   02/28/2024 11:42 AM

## 2024-03-01 DIAGNOSIS — E22.1 HYPERPROLACTINEMIA: ICD-10-CM

## 2024-03-01 DIAGNOSIS — N64.3 GALACTORRHEA NOT ASSOCIATED WITH CHILDBIRTH: Primary | ICD-10-CM

## 2024-03-28 ENCOUNTER — TELEPHONE (OUTPATIENT)
Dept: ENDOCRINOLOGY | Facility: CLINIC | Age: 29
End: 2024-03-28
Payer: MEDICAID

## 2024-10-23 ENCOUNTER — IMMUNIZATION (OUTPATIENT)
Dept: PRIMARY CARE CLINIC | Facility: CLINIC | Age: 29
End: 2024-10-23
Payer: MEDICAID

## 2024-10-23 DIAGNOSIS — Z23 FLU VACCINE NEED: Primary | ICD-10-CM

## 2024-10-23 PROCEDURE — 99999PBSHW PR PBB SHADOW TECHNICAL ONLY FILED TO HB: Mod: PBBFAC,,,

## 2024-10-23 PROCEDURE — 90656 IIV3 VACC NO PRSV 0.5 ML IM: CPT | Mod: PBBFAC

## 2024-10-23 PROCEDURE — 90471 IMMUNIZATION ADMIN: CPT | Mod: PBBFAC

## 2024-10-23 RX ADMIN — INFLUENZA VIRUS VACCINE 0.5 ML: 15; 15; 15 SUSPENSION INTRAMUSCULAR at 02:10

## 2025-03-17 ENCOUNTER — OFFICE VISIT (OUTPATIENT)
Dept: OBSTETRICS AND GYNECOLOGY | Facility: CLINIC | Age: 30
End: 2025-03-17
Payer: MEDICAID

## 2025-03-17 VITALS
HEART RATE: 77 BPM | BODY MASS INDEX: 37.56 KG/M2 | WEIGHT: 212 LBS | HEIGHT: 63 IN | DIASTOLIC BLOOD PRESSURE: 62 MMHG | SYSTOLIC BLOOD PRESSURE: 111 MMHG

## 2025-03-17 DIAGNOSIS — Z01.419 ENCOUNTER FOR ANNUAL ROUTINE GYNECOLOGICAL EXAMINATION: Primary | ICD-10-CM

## 2025-03-17 DIAGNOSIS — N64.3 GALACTORRHEA NOT ASSOCIATED WITH CHILDBIRTH: ICD-10-CM

## 2025-03-17 DIAGNOSIS — Z12.4 CERVICAL CANCER SCREENING: ICD-10-CM

## 2025-03-17 DIAGNOSIS — N94.6 DYSMENORRHEA: ICD-10-CM

## 2025-03-17 PROCEDURE — 99395 PREV VISIT EST AGE 18-39: CPT | Mod: S$PBB,,, | Performed by: OBSTETRICS & GYNECOLOGY

## 2025-03-17 PROCEDURE — 3008F BODY MASS INDEX DOCD: CPT | Mod: CPTII,,, | Performed by: OBSTETRICS & GYNECOLOGY

## 2025-03-17 PROCEDURE — 3074F SYST BP LT 130 MM HG: CPT | Mod: CPTII,,, | Performed by: OBSTETRICS & GYNECOLOGY

## 2025-03-17 PROCEDURE — 99999 PR PBB SHADOW E&M-EST. PATIENT-LVL III: CPT | Mod: PBBFAC,,, | Performed by: OBSTETRICS & GYNECOLOGY

## 2025-03-17 PROCEDURE — 3078F DIAST BP <80 MM HG: CPT | Mod: CPTII,,, | Performed by: OBSTETRICS & GYNECOLOGY

## 2025-03-17 PROCEDURE — 1159F MED LIST DOCD IN RCRD: CPT | Mod: CPTII,,, | Performed by: OBSTETRICS & GYNECOLOGY

## 2025-03-17 PROCEDURE — 99213 OFFICE O/P EST LOW 20 MIN: CPT | Mod: PBBFAC | Performed by: OBSTETRICS & GYNECOLOGY

## 2025-03-17 PROCEDURE — 1160F RVW MEDS BY RX/DR IN RCRD: CPT | Mod: CPTII,,, | Performed by: OBSTETRICS & GYNECOLOGY

## 2025-03-17 RX ORDER — DROSPIRENONE AND ETHINYL ESTRADIOL 0.02-3(28)
1 KIT ORAL DAILY
Qty: 28 TABLET | Refills: 11 | Status: SHIPPED | OUTPATIENT
Start: 2025-03-17 | End: 2026-03-17

## 2025-03-17 NOTE — PROGRESS NOTES
"SUBJECTIVE:   29 y.o. female for annual routine Pap and checkup.  Has been having back pain her cycles and would like to get back on birth control.  She also notes that she still has some expression of milk if she presses on her breasts or lays on them.  She does not want the Depo-Provera injection or the IUD.  She is interested in OCP  Current Medications[1]  Allergies: Patient has no known allergies.   Patient's last menstrual period was 03/09/2025.    ROS:  Feeling well. No dyspnea or chest pain on exertion.  No abdominal pain, change in bowel habits, black or bloody stools.  No urinary tract symptoms. GYN ROS: normal menses, no abnormal bleeding, pelvic pain or discharge, no breast pain or new or enlarging lumps on self exam, she complains of back pain with cycles. No neurological complaints.    OBJECTIVE:   The patient appears well, alert, oriented x 3, in no distress.  /62   Pulse 77   Ht 5' 3" (1.6 m)   Wt 96.2 kg (212 lb)   LMP 03/09/2025   BMI 37.55 kg/m²   ENT normal.  Neck supple. No adenopathy or thyromegaly. ALLEN. Lungs are clear, good air entry, no wheezes, rhonchi or rales. S1 and S2 normal, no murmurs, regular rate and rhythm. Abdomen soft without tenderness, guarding, mass or organomegaly. Extremities show no edema, normal peripheral pulses. Neurological is normal, no focal findings.    BREAST EXAM: breasts appear normal, no suspicious masses, no skin or nipple changes or axillary nodes    PELVIC EXAM: VULVA: normal appearing vulva with no masses, tenderness or lesions, VAGINA: normal appearing vagina with normal color and discharge, no lesions, CERVIX: normal appearing cervix without discharge or lesions, UTERUS: uterus is normal size, shape, consistency and nontender, ADNEXA: normal adnexa in size, nontender and no masses, PAP: Pap smear done today, thin-prep method    ASSESSMENT:   well woman  no contraindication to continue use of oral contraceptives    PLAN:   pap smear  counseled " on breast self exam and use and side effects of OCP's  return annually or prn         [1]   Current Outpatient Medications   Medication Sig Dispense Refill    drospirenone-ethinyl estradioL (YOLY) 3-0.02 mg per tablet Take 1 tablet by mouth once daily. 28 tablet 11     No current facility-administered medications for this visit.

## 2025-04-03 ENCOUNTER — RESULTS FOLLOW-UP (OUTPATIENT)
Dept: OBSTETRICS AND GYNECOLOGY | Facility: CLINIC | Age: 30
End: 2025-04-03

## (undated) DEVICE — NDL ONLY TB 25G X 5/8CA

## (undated) DEVICE — BAG HYPERINFLATION .5 LITER

## (undated) DEVICE — TOWEL OR STER DISP BLUE 17X27

## (undated) DEVICE — TIP YANKAUERS BULB NO VENT

## (undated) DEVICE — GOWN SURGICAL BRTHBL XL

## (undated) DEVICE — CLOSURE SKIN STERI STRIP 1/4X4

## (undated) DEVICE — GOWN SURGICAL BRTHBL XL XLNG

## (undated) DEVICE — SYR BULB 60CC IRRIGATION

## (undated) DEVICE — WARMER DRAPE STERILE LF

## (undated) DEVICE — SUT 0 60IN PDS II VIO MONO

## (undated) DEVICE — TRAY CATH FOL SIL URIMTR 16FR

## (undated) DEVICE — CLAMP UMBILICAL CORD OFF-WHITE

## (undated) DEVICE — SUT MCRYL PLUS UD 3-0 27IN

## (undated) DEVICE — ELECTRODE REM PLYHSV RETURN 9

## (undated) DEVICE — Device

## (undated) DEVICE — BLANKET UPPER BODY 78.7X29.9IN

## (undated) DEVICE — SUT MONOCRYL 4-0 PS-2

## (undated) DEVICE — LINER GLOVE POWDERFREE SZ 6.5

## (undated) DEVICE — LINER SUCTION 3000CC

## (undated) DEVICE — SUT MONOCRYL VIOLET CTX 0 36IN

## (undated) DEVICE — SPONGE LAP 18X18 PREWASHED

## (undated) DEVICE — CLOSURE SKIN STERI STRIP 1/2X4

## (undated) DEVICE — RETAINER VISCERA FISH GREER MD

## (undated) DEVICE — GLOVE BIOGEL ECLIPSE SZ 7

## (undated) DEVICE — CAUTERY PUSHBUTTON PENCIL

## (undated) DEVICE — APPLICATOR STRL COT 2INNR 6IN

## (undated) DEVICE — DRAPE CORETEMP FLD WRM 56X62IN

## (undated) DEVICE — SPONGE X-RAY LAP DETCT 18X18IN

## (undated) DEVICE — STRAP KNEE & BODY DISP 4X34IN

## (undated) DEVICE — UNDERGLOVES BIOGEL PI SIZE 7.5

## (undated) DEVICE — GLOVE BIOGEL ECLIPSE SZ 7.5

## (undated) DEVICE — COVER OVERHEAD SURG LT BLUE

## (undated) DEVICE — STAPLER SKIN PROXIMATE WIDE

## (undated) DEVICE — SUT VICRYL COAT 910 1X36IN

## (undated) DEVICE — KIT EXTRACTOR DEL SYS

## (undated) DEVICE — CLIPPER BLADE MOD 4406 (CAREF)

## (undated) DEVICE — PACK C-SECTION CUSTOM

## (undated) DEVICE — SOL NACL IRR 1000ML BTL

## (undated) DEVICE — SUT GUT PL 3-0 CT 852H

## (undated) DEVICE — MEPILEX BORDER POST-OP AG

## (undated) DEVICE — SUT 0 36IN CHROMIC GUT CT-1

## (undated) DEVICE — UNDERPAD DISPOSABLE 30X30IN

## (undated) DEVICE — CLEANER CAUT TIP STRL 2X2IN

## (undated) DEVICE — CLIP FILSHIE: Type: IMPLANTABLE DEVICE | Status: NON-FUNCTIONAL

## (undated) DEVICE — OXISENSOR NEONATAL/ADULT N/S

## (undated) DEVICE — SYR 10CC LUER LOCK

## (undated) DEVICE — UNDERGLOVE BIOGEL PI SZ 6.5 LF

## (undated) DEVICE — LINER SUCTION 1500CC

## (undated) DEVICE — GLOVE BIOGEL 7.0

## (undated) DEVICE — UNDERGLOVES BIOGEL PI SZ 7 LF

## (undated) DEVICE — BLANKET KUDDLE-UP DINO 30X40IN

## (undated) DEVICE — APPLICATOR CHLORAPREP ORN 26ML

## (undated) DEVICE — PACK SURG C SECTION

## (undated) DEVICE — GLOVE SURG ULTRA TOUCH 7

## (undated) DEVICE — ADHESIVE MASTISOL VIAL 48/BX

## (undated) DEVICE — COVER WARMING BODY UPPER 78X21